# Patient Record
Sex: MALE | Race: WHITE | NOT HISPANIC OR LATINO | Employment: OTHER | URBAN - NONMETROPOLITAN AREA
[De-identification: names, ages, dates, MRNs, and addresses within clinical notes are randomized per-mention and may not be internally consistent; named-entity substitution may affect disease eponyms.]

---

## 2023-08-24 PROBLEM — R09.89 PULSE IRREGULARITY: Status: ACTIVE | Noted: 2023-08-24

## 2023-08-24 PROBLEM — I48.91 ATRIAL FIBRILLATION (MULTI): Status: ACTIVE | Noted: 2023-08-24

## 2023-08-24 PROBLEM — M79.89 SOFT TISSUE MASS: Status: ACTIVE | Noted: 2023-08-24

## 2023-08-24 RX ORDER — LISINOPRIL AND HYDROCHLOROTHIAZIDE 20; 25 MG/1; MG/1
1 TABLET ORAL DAILY
COMMUNITY

## 2023-08-24 RX ORDER — AMLODIPINE BESYLATE 10 MG/1
10 TABLET ORAL DAILY
COMMUNITY

## 2023-08-24 RX ORDER — FINASTERIDE 5 MG/1
5 TABLET, FILM COATED ORAL DAILY
COMMUNITY

## 2023-08-24 RX ORDER — DOXAZOSIN 8 MG/1
8 TABLET ORAL DAILY
COMMUNITY

## 2023-08-24 RX ORDER — ATORVASTATIN CALCIUM 40 MG/1
40 TABLET, FILM COATED ORAL DAILY
COMMUNITY

## 2023-08-24 RX ORDER — ALBUTEROL SULFATE 90 UG/1
AEROSOL, METERED RESPIRATORY (INHALATION) EVERY 4 HOURS PRN
COMMUNITY

## 2023-08-24 RX ORDER — METOPROLOL SUCCINATE 50 MG/1
50 TABLET, EXTENDED RELEASE ORAL DAILY
COMMUNITY

## 2023-10-03 ENCOUNTER — CLINICAL SUPPORT (OUTPATIENT)
Dept: CARDIAC REHAB | Facility: HOSPITAL | Age: 78
End: 2023-10-03
Payer: MEDICARE

## 2023-10-05 ENCOUNTER — APPOINTMENT (OUTPATIENT)
Dept: CARDIAC REHAB | Facility: HOSPITAL | Age: 78
End: 2023-10-05
Payer: MEDICARE

## 2023-10-10 ENCOUNTER — CLINICAL SUPPORT (OUTPATIENT)
Dept: CARDIAC REHAB | Facility: HOSPITAL | Age: 78
End: 2023-10-10
Payer: MEDICARE

## 2023-10-12 ENCOUNTER — APPOINTMENT (OUTPATIENT)
Dept: CARDIAC REHAB | Facility: HOSPITAL | Age: 78
End: 2023-10-12
Payer: MEDICARE

## 2023-10-17 ENCOUNTER — CLINICAL SUPPORT (OUTPATIENT)
Dept: CARDIAC REHAB | Facility: HOSPITAL | Age: 78
End: 2023-10-17
Payer: MEDICARE

## 2023-10-17 NOTE — PROGRESS NOTES
Pulmonary Rehabilitation Assessment    Name: Aquilino Koenig Jr.  Medical Record Number: [id number]  YOB: 1945    Today’s Date: 10/03/2023  Primary Care Physician: Lopez Pierce MD  Referring Physician: Rebeca    Program Location: [facility]  Primary Diagnosis: Idiopathic Pulmonary Fibrosis J84.112  Date of Diagnosis: 2023    Session #: 26    Oxygen Assessment    SP02 rest: 99%  SP02 exertion: 89%    Oxygen Use  Requires supplemental O2:    Liters at Rest: RA   Liters with Exertion: 0  Using O2 as prescribed: NA    Oxygen (Supplemental 02 use only)  Demonstrates appropriate knowledge of 02 use? NA  Demonstrates knowledge of 02 safety? NA  Home 02 system:  Portable 02:    Hypoxemia managed?:   Home Pulse Oximeter?: Yes      MMRC Survey score:  Intake: 3  Discharge:     Goal Status: In progress  Oxygen Goals:  Oxygen Interventions:      Psychosocial Assessment    Pt reported/currently experiencing: No  Currently seeing a mental health provider: No  Social Support: yes/wife son  PHQ-9 Survey Score:   Intake:   Discharge:     CAT Survey Score:  Intake:9  Discharge:    Learning assessment/barriers: None  Preferred learning method: Auditory     Educational Assessment:  Pulmonary Knowledge Test:   Intake: 13/15  Discharge: /15    Stages of Change:Maintenance    Goal Status: In progress  Psychosocial Goals: maintain  Psychosocial Interventions/Education: Education class    Nutrition Assessment:    Current Dietary Guidelines:  Barriers to dietary change:     Diet Habit Survey: drop down selection of Picture Your Plate, class  Plate: ed. class  Intake:   Discharge:       Weight Management    Height: 5'7  Weight: 192  BMI: 30.1    Goal Status:   Nutrition Goals: maintain  Nutrition Interventions/Education: Nutrition class with dietician    Exercise Assessment  Current Home Exercise: Yes   Mode: [facility]  Days/Week: 4  Min/Day: 35min    6-minute Walk Assessment:  Intake: 6-MWT distance  (meters):316.0m     FiO2:RA     SPO2:99% pre walk & 90% post walk  Discharge: 6-MWT distance (meters):      FiO2:     SPO2:    Exercise Prescription:    Based on: {6-min walk test/duke   Frequency:  3days/week   Mode: Treadmill,   Duration:  total aerobic minutes   Intensity: RPE 12       Target        Max METS 2.0       SpO2 Range 100to 89 %       O2 Flow Rate to L/min     Modality METS Load Duration   1 Pre-Exercise      2 Treadmill  2.1 1.5 12:00   3 Nustep 4000  2.9-3.0 6.0 23:00   4 Recumbent Cycle       5 Weights       6 Post-Exercise        Resistance Training: Yes   Home Exercise Prescription given: Yes     Goal Status: In progress  Exercise Goals: Increase strength, stamina & endurance  Exercise Interventions/Education: Education class about exercises with explanations & demonstrations    Other Core Components/Risk Factor Assessment:    Medication adherence:  Current Medications:    Current Outpatient Medications   Medication Sig Dispense Refill    albuterol 90 mcg/actuation inhaler Inhale every 4 hours if needed.      amLODIPine (Norvasc) 10 mg tablet Take 1 tablet (10 mg) by mouth once daily.      apixaban (Eliquis) 5 mg tablet 1 tablet (5 mg).      atorvastatin (Lipitor) 40 mg tablet Take 1 tablet (40 mg) by mouth once daily.      doxazosin (Cardura) 8 mg tablet Take 1 tablet (8 mg) by mouth once daily.      finasteride (Proscar) 5 mg tablet Take 1 tablet (5 mg) by mouth once daily.      lisinopriL-hydrochlorothiazide 20-25 mg tablet Take 1 tablet by mouth once daily.      metoprolol succinate XL (Toprol-XL) 50 mg 24 hr tablet Take 1 tablet (50 mg) by mouth once daily.       No current facility-administered medications for this visit.     Allergies:  No Known Allergies    Taking medications as prescribed: Yes    If no, why:    Uses pill box/organizer:    Carries medication list: Yes     Blood Pressure Management:  Hx of Hypertension: Yes   Resting BP:115/71   POST BP: 116/74    Goal Status: In  progress  Other Core Component Goals:   Other Core Component Interventions/Education:      # of hospital admissions due to lung problems: 0  # of ER visits due to lung problems: 0    Individual Patient Goals:  See above  General Comments:        Rehab Staff Signature: Bree Barnes, RRT

## 2023-10-18 NOTE — PROGRESS NOTES
Pulmonary Daily Rehabilitation Assessment    Name: Aquilino Koenig Jr.  Medical Record Number: [id number]  YOB: 1945    Today’s Date: 10/10/2023  Primary Care Physician: Lopez Pierce MD    Session #: 28    Oxygen Assessment    SP02 rest: 94%  SP02 exertion: 86%    Oxygen Use  Requires supplemental O2:    Liters at Rest: 0   Liters with Exertion: 0  Using O2 as prescribed:       Weight Management    Height: 5'7  Weight: 189LBS.  BMI: 30.1       Daily Activity Log   Modality METS Load Duration   1 Pre-Exercise      2 Treadmill  2.1   1.5 12:00   3 Nustep 4000  2.9-3.0 6.0 23:00   4 Recumbent Cycle       5 Weights       6 UBE W5 46-48RPM 15:00   7 Rower      8 Aero Bike      9 Post-Exercise            Other Core Components/Risk Factor Assessment:    Blood Pressure Management:  Hx of Hypertension: Yes   Resting BP: 138/93  POST BP: 140/82      General Comments:        Rehab Staff Signature: Bree Barnes, RRTESTEBAN ALATORRE

## 2023-10-18 NOTE — PROGRESS NOTES
Pulmonary Daily Rehabilitation Assessment    Name: Aquilino Koenig Jr.  Medical Record Number: [id number]  YOB: 1945    Today’s Date: 10/17/2023  Primary Care Physician: Lopez Pierce MD    Session #: 30    Oxygen Assessment    SP02 rest: 97%  SP02 exertion: 88 %    Oxygen Use  Requires supplemental O2:    Liters at Rest: 0   Liters with Exertion: 0  Using O2 as prescribed:       Weight Management    Height: 5'7  Weight: 188        Daily Activity Log   Modality METS Load Duration   1 Pre-Exercise      2 Treadmill  2.1 1.5 12:00   3 Nustep  2.7 6.0 23:00   4 Recumbent Cycle       5 Weights       6 UBE W5-10 48-53RPM 15:00   7 Rower      8 Aero Bike      9 Post-Exercise            Other Core Components/Risk Factor Assessment:    Blood Pressure Management:  Hx of Hypertension: Yes   Resting BP: 13/77  POST BP: 117/75      General Comments:Education: Medications  Pt. Will discontinue after today he has all his education classes and will continue to exercise at [facility]. 6MWT on 10/19/2023        Rehab Staff Signature: Bree Barnes RRTPulmonary Daily Rehabilitation Assessment      General Comments:        Rehab Staff Signature: Bree Barnes RRT  Cardiac Rehabilitation Initial Assessment (iITP)    Name: Aquilino Koenig Jr.  Medical Record Number: [id number]  YOB: 1945    Today’s Date: 10/18/2023  Primary Care Physician: Lopez Pierce MD  Referring Physician: Lopez Pierce    General  Idiopathetic pulmonary fibrosis        Medication adherence:   Current Medications:   Current Outpatient Medications   Medication Sig Dispense Refill    albuterol 90 mcg/actuation inhaler Inhale every 4 hours if needed.      amLODIPine (Norvasc) 10 mg tablet Take 1 tablet (10 mg) by mouth once daily.      apixaban (Eliquis) 5 mg tablet 1 tablet (5 mg).      atorvastatin (Lipitor) 40 mg tablet Take 1 tablet (40 mg) by mouth once daily.      doxazosin (Cardura) 8 mg tablet Take 1 tablet (8 mg) by  mouth once daily.      finasteride (Proscar) 5 mg tablet Take 1 tablet (5 mg) by mouth once daily.      lisinopriL-hydrochlorothiazide 20-25 mg tablet Take 1 tablet by mouth once daily.      metoprolol succinate XL (Toprol-XL) 50 mg 24 hr tablet Take 1 tablet (50 mg) by mouth once daily.       No current facility-administered medications for this visit.     Rehaab Staff Signature: Bree Barnes RRT  Electronic MD Review Signature:

## 2023-10-18 NOTE — PROGRESS NOTES
Pulmonary Daily Rehabilitation Assessment    Name: Aquilino Koenig Jr.  Medical Record Number: [id number]  YOB: 1945    Today’s Date: 10/17/2023  Primary Care Physician: Lopez Pierce MD    Session #: 30    Oxygen Assessment    SP02 rest: 97 %  SP02 exertion: 88 %    Oxygen Use  Requires supplemental O2:    Liters at Rest: 0   Liters with Exertion: 0  Using O2 as prescribed:       Weight Management    Height: 5'7  Weight: 188LBS.  BMI:30.1      Daily Activity Log   Modality METS Load Duration   1 Pre-Exercise      2 Treadmill  2.1 1.5 12:00   3 Nustep  2.7 6.0 23:00   4 Recumbent Cycle       5 Weights       6 UBE W5-10 48-53rpm 15:00   7 Rower      8 Aero Bike      9 Post-Exercise            Other Core Components/Risk Factor Assessment:    Blood Pressure Management:  Hx of Hypertension: Yes   Resting BP: 133/77  POST BP: 117/75      General Comments:  PATIENTS LAST DAY DUE TO COST - HE WILL CONTINUE TO EXERCISE AT [facility] - WILL COME IN 10/19/23 FOR END 6MWT      Rehab Staff Signature: Bree Barnes, RRTFOR MAME ALATORRE

## 2023-10-18 NOTE — SIGNIFICANT EVENT
Pt. Education: Airway clearance Instructed on acapella use/ cleaning. Discussed purse lip breathing and patients workouts outside for NE

## 2023-10-19 ENCOUNTER — CLINICAL SUPPORT (OUTPATIENT)
Dept: CARDIAC REHAB | Facility: HOSPITAL | Age: 78
End: 2023-10-19
Payer: MEDICARE

## 2023-10-19 NOTE — PROGRESS NOTES
Cardiac Rehabilitation Initial Assessment (iITP)    Name: Aquilino Koenig Jr.  Medical Record Number: [id number]  YOB: 1945    Today’s Date: 10/19/2023  Primary Care Physician: Lopez Pierce MD  Referring Physician: ***    General  No diagnosis found.    AACVPR Risk Stratification:{AACVPR Risk Stratification:79356}    Medical History  No past medical history on file.  No past surgical history on file.  Allergies: No Known Allergies    Psychosocial Assessment  Patient is returning for a follow up visit after reporting minimal to mild depressive symptoms.     Please document a new PHQ-9 score.    Pt reported/currently experiencing: {Yes/No:81390}  Currently seeing a mental health provider: {Yes/No:66403}  Social Support: {Yes/No:11069}    Learning assessment/barriers: {Assessment/barriers:78805}  Preferred learning method: {Preferred learning method:58081}   Quality of Life Survey:{Quality of Life Survey:61600}    Educational Assessment:  Cardiac Knowledge Test: ***/15    Stages of Change:{Stages of change:33369}    Psychosocial Goals: ***  Psychosocial Interventions/Education: ***        Nutrition Assessment:    Hyperlipidemia: {Yes/No:60938} ***    Lipids: ***  Lipid Draw Date: ***    Current Dietary Guidelines:{Dietary Guidelines:38653}  Barriers to dietary change: {Yes/No:39811} ***    Diet Habit Survey score: ***    Diabetes Assessment    Diabetes:{Yes or No:37976}   Medication: {Medication:41091}  Last Hemoglobin A1C: ***   Last Hemoglobin A1C date: ***  Pt monitors BS at home: {Yes/No:73697}  Frequency: ***  FBS range: ***  Hypoglycemic Episodes: {Yes/No:20218}    Weight Management:    There were no vitals filed for this visit.  There is no height or weight on file to calculate BMI.    Nutrition Goals: ***  Nutrition Interventions/Education: ***    Exercise Assessment:  Current Home Exercise: {Yes/No:03014}  Mode: ***  Days/Week: ***  Min/Day: ***    Exercise Prescription:    Based on:  {{Exercise Prescription:25983}   Frequency:  *** days/week   Mode: {Mode:99621}   Duration: *** total aerobic minutes   Intensity: RPE ***       Target HR ***       METS ***       SpO2 Range *** %       O2 Flow Rate *** L/min     Modality METS Load Duration   1 Pre-Exercise *** *** ***   2 Treadmill *** *** *** ***   3 Nustep 4000 *** *** *** ***   4 Recumbent Cycle *** *** *** ***   5 Weights *** *** *** ***   6 Post-Exercise *** *** ***     Exercise Goals: ***  Exercise Interventions/Education: ***    Other Core Components/Risk Factor Assessment:    Medication adherence:   Current Medications:   Current Outpatient Medications   Medication Sig Dispense Refill    albuterol 90 mcg/actuation inhaler Inhale every 4 hours if needed.      amLODIPine (Norvasc) 10 mg tablet Take 1 tablet (10 mg) by mouth once daily.      apixaban (Eliquis) 5 mg tablet 1 tablet (5 mg).      atorvastatin (Lipitor) 40 mg tablet Take 1 tablet (40 mg) by mouth once daily.      doxazosin (Cardura) 8 mg tablet Take 1 tablet (8 mg) by mouth once daily.      finasteride (Proscar) 5 mg tablet Take 1 tablet (5 mg) by mouth once daily.      lisinopriL-hydrochlorothiazide 20-25 mg tablet Take 1 tablet by mouth once daily.      metoprolol succinate XL (Toprol-XL) 50 mg 24 hr tablet Take 1 tablet (50 mg) by mouth once daily.       No current facility-administered medications for this visit.                 Taking medications as prescribed: {Yes/No:41517}   If no, why: ***   Uses pill box/organizer: {Yes/No:25493}   Carries medication list: {Yes/No:34750}    Blood Pressure Management:  Hx of Hypertension: {Yes/No:13197}  Resting BP: Growth %ile SmartLinks can only be used for patients less than 20 years old.     Heart Failure Management:  Hx of Heart Failure:{Yes/No:65234}  Type (selection): {Type (selection):22236}  Most recent EF: @LVEF%@  Date:***  Onset of heart failure diagnosis: ***  Last heart failure hospitalization: ***  Number of HF admissions  per year: ***  Symptoms:{Symptoms:76734}  Is there a family Hx of HF:{Yes/No:48977}  Does patient obtain daily weight {Yes/No:80865}    Smoking/Tobacco Assessment:    Tobacco Use: Not on file       Other Core Component Goals: ***  Other Core Component Interventions/Education: ***       Individual Patient Goals:  ***      Rehaab Staff Signature: Bree Barnes, RRT  Electronic MD Review Signature: ***

## 2023-10-19 NOTE — PROGRESS NOTES
Pulmonary Daily Rehabilitation Assessment    Name: Aquilino Koenig Jr.  Medical Record Number: [id number]  YOB: 1945    Today’s Date: 10/19/2023  Primary Care Physician: Lopez Pierce MD    Session #:     Oxygen Assessment    SP02 rest: 99RA %  SP02 exertion: 89 RA %    Oxygen Use  Requires supplemental O2:    Liters at Rest: 0   Liters with Exertion: 0  Using O2 as prescribed:       Weight Management    Height: 5'7  Weight: 190 lbs  BMI:       Daily Activity Log   Modality METS Load Duration   1 Pre-Exercise      2 Treadmill       3 Nustep 4000       4 Recumbent Cycle       5 Weights       6 UBE      7 Rower      8 Aero Bike      9 Post-Exercise            Other Core Components/Risk Factor Assessment:    Blood Pressure Management:  Hx of Hypertension: Yes   Resting BP: 123/82  POST BP:119/56      General Comments:  Pt in today for end paperwork and 6MWT  Pt. Walked 1,036ft/316.0M in 6min with 0 stops and 0 assists on room air  Pts lowest SpO2 was 89% with highest heartrate 117      Rehab Staff Signature: Bree Barnes, RRT

## 2023-10-19 NOTE — PROGRESS NOTES
Pulmonary Daily Rehabilitation Assessment    Name: Aquilino Koenig Jr.  Medical Record Number: [id number]  YOB: 1945    Today’s Date: 10/19/2023  Primary Care Physician: Lopez Pierce MD    Session #:     Oxygen Assessment    SP02 rest: 99RA %  SP02 exertion: 89 RA %    Oxygen Use  Requires supplemental O2:    Liters at Rest: 0   Liters with Exertion: 0  Using O2 as prescribed:       Weight Management    Height: 5'7  Weight: 190 lbs  BMI:       Daily Activity Log   Modality METS Load Duration   1 Pre-Exercise      2 Treadmill       3 Nustep 4000       4 Recumbent Cycle       5 Weights       6 UBE      7 Rower      8 Aero Bike      9 Post-Exercise            Other Core Components/Risk Factor Assessment:    Blood Pressure Management:  Hx of Hypertension: Yes   Resting BP: 123/82  POST BP:119/56      General Comments:  Pt in today for end paperwork and 6MWT  Pt. Walked 1,036ft/316.0M in 6min with 0 stops and 0 assists on room air  Pts lowest SpO2 was 89% with highest heartrate 117      Rehab Staff Signature: Bree Barnes, RRT

## 2023-10-19 NOTE — PROGRESS NOTES
Cardiac Rehabilitation Initial Assessment (iITP)    Name: Aquilino Koenig Jr.  Medical Record Number: [id number]  YOB: 1945    Today’s Date: 10/19/2023  Primary Care Physician: Lopez Pierce MD  Referring Physician: ***    General  No diagnosis found.    AACVPR Risk Stratification:{AACVPR Risk Stratification:99304}    Medical History  No past medical history on file.  No past surgical history on file.  Allergies: No Known Allergies    Psychosocial Assessment  Patient is returning for a follow up visit after reporting minimal to mild depressive symptoms.     Please document a new PHQ-9 score.    Pt reported/currently experiencing: {Yes/No:99319}  Currently seeing a mental health provider: {Yes/No:43243}  Social Support: {Yes/No:18939}    Learning assessment/barriers: {Assessment/barriers:33571}  Preferred learning method: {Preferred learning method:81967}   Quality of Life Survey:{Quality of Life Survey:12362}    Educational Assessment:  Cardiac Knowledge Test: ***/15    Stages of Change:{Stages of change:66449}    Psychosocial Goals: ***  Psychosocial Interventions/Education: ***        Nutrition Assessment:    Hyperlipidemia: {Yes/No:36767} ***    Lipids: ***  Lipid Draw Date: ***    Current Dietary Guidelines:{Dietary Guidelines:39135}  Barriers to dietary change: {Yes/No:24116} ***    Diet Habit Survey score: ***    Diabetes Assessment    Diabetes:{Yes or No:95238}   Medication: {Medication:71789}  Last Hemoglobin A1C: ***   Last Hemoglobin A1C date: ***  Pt monitors BS at home: {Yes/No:69530}  Frequency: ***  FBS range: ***  Hypoglycemic Episodes: {Yes/No:89333}    Weight Management:    There were no vitals filed for this visit.  There is no height or weight on file to calculate BMI.    Nutrition Goals: ***  Nutrition Interventions/Education: ***    Exercise Assessment:  Current Home Exercise: {Yes/No:11815}  Mode: ***  Days/Week: ***  Min/Day: ***    Exercise Prescription:    Based on:  {{Exercise Prescription:82269}   Frequency:  *** days/week   Mode: {Mode:10983}   Duration: *** total aerobic minutes   Intensity: RPE ***       Target HR ***       METS ***       SpO2 Range *** %       O2 Flow Rate *** L/min     Modality METS Load Duration   1 Pre-Exercise *** *** ***   2 Treadmill *** *** *** ***   3 Nustep 4000 *** *** *** ***   4 Recumbent Cycle *** *** *** ***   5 Weights *** *** *** ***   6 Post-Exercise *** *** ***     Exercise Goals: ***  Exercise Interventions/Education: ***    Other Core Components/Risk Factor Assessment:    Medication adherence:   Current Medications:   Current Outpatient Medications   Medication Sig Dispense Refill    albuterol 90 mcg/actuation inhaler Inhale every 4 hours if needed.      amLODIPine (Norvasc) 10 mg tablet Take 1 tablet (10 mg) by mouth once daily.      apixaban (Eliquis) 5 mg tablet 1 tablet (5 mg).      atorvastatin (Lipitor) 40 mg tablet Take 1 tablet (40 mg) by mouth once daily.      doxazosin (Cardura) 8 mg tablet Take 1 tablet (8 mg) by mouth once daily.      finasteride (Proscar) 5 mg tablet Take 1 tablet (5 mg) by mouth once daily.      lisinopriL-hydrochlorothiazide 20-25 mg tablet Take 1 tablet by mouth once daily.      metoprolol succinate XL (Toprol-XL) 50 mg 24 hr tablet Take 1 tablet (50 mg) by mouth once daily.       No current facility-administered medications for this visit.                 Taking medications as prescribed: {Yes/No:81076}   If no, why: ***   Uses pill box/organizer: {Yes/No:63037}   Carries medication list: {Yes/No:22694}    Blood Pressure Management:  Hx of Hypertension: {Yes/No:90862}  Resting BP: Growth %ile SmartLinks can only be used for patients less than 20 years old.     Heart Failure Management:  Hx of Heart Failure:{Yes/No:14720}  Type (selection): {Type (selection):80398}  Most recent EF: @LVEF%@  Date:***  Onset of heart failure diagnosis: ***  Last heart failure hospitalization: ***  Number of HF admissions  per year: ***  Symptoms:{Symptoms:74995}  Is there a family Hx of HF:{Yes/No:70231}  Does patient obtain daily weight {Yes/No:25247}    Smoking/Tobacco Assessment:    Tobacco Use: Not on file       Other Core Component Goals: ***  Other Core Component Interventions/Education: ***       Individual Patient Goals:  ***      Rehaab Staff Signature: Bree Barnes, RRT  Electronic MD Review Signature: ***

## 2023-10-24 ENCOUNTER — APPOINTMENT (OUTPATIENT)
Dept: CARDIAC REHAB | Facility: HOSPITAL | Age: 78
End: 2023-10-24
Payer: MEDICARE

## 2023-10-26 ENCOUNTER — APPOINTMENT (OUTPATIENT)
Dept: CARDIAC REHAB | Facility: HOSPITAL | Age: 78
End: 2023-10-26
Payer: MEDICARE

## 2023-10-31 ENCOUNTER — APPOINTMENT (OUTPATIENT)
Dept: CARDIAC REHAB | Facility: HOSPITAL | Age: 78
End: 2023-10-31
Payer: MEDICARE

## 2023-11-02 ENCOUNTER — APPOINTMENT (OUTPATIENT)
Dept: CARDIAC REHAB | Facility: HOSPITAL | Age: 78
End: 2023-11-02
Payer: MEDICARE

## 2023-11-09 ENCOUNTER — APPOINTMENT (OUTPATIENT)
Dept: CARDIAC REHAB | Facility: HOSPITAL | Age: 78
End: 2023-11-09

## 2024-10-18 ENCOUNTER — HOSPITAL ENCOUNTER (OUTPATIENT)
Dept: RADIOLOGY | Facility: HOSPITAL | Age: 79
Discharge: HOME | End: 2024-10-18
Payer: MEDICARE

## 2024-10-18 DIAGNOSIS — R06.00 DYSPNEA, UNSPECIFIED: ICD-10-CM

## 2024-10-18 DIAGNOSIS — R06.00 DYSPNEA, UNSPECIFIED: Primary | ICD-10-CM

## 2024-10-18 PROCEDURE — 71275 CT ANGIOGRAPHY CHEST: CPT

## 2024-10-18 PROCEDURE — 2550000001 HC RX 255 CONTRASTS

## 2024-11-04 ENCOUNTER — APPOINTMENT (OUTPATIENT)
Dept: CARDIOLOGY | Facility: HOSPITAL | Age: 79
DRG: 189 | End: 2024-11-04
Payer: MEDICARE

## 2024-11-04 ENCOUNTER — APPOINTMENT (OUTPATIENT)
Dept: RADIOLOGY | Facility: HOSPITAL | Age: 79
DRG: 189 | End: 2024-11-04
Payer: MEDICARE

## 2024-11-04 ENCOUNTER — HOSPITAL ENCOUNTER (INPATIENT)
Facility: HOSPITAL | Age: 79
LOS: 1 days | Discharge: HOME | DRG: 189 | End: 2024-11-06
Attending: INTERNAL MEDICINE | Admitting: INTERNAL MEDICINE
Payer: MEDICARE

## 2024-11-04 DIAGNOSIS — J84.112 IPF (IDIOPATHIC PULMONARY FIBROSIS) (MULTI): ICD-10-CM

## 2024-11-04 DIAGNOSIS — R06.09 DYSPNEA ON EXERTION: ICD-10-CM

## 2024-11-04 DIAGNOSIS — J96.21 ACUTE ON CHRONIC RESPIRATORY FAILURE WITH HYPOXIA (MULTI): Primary | ICD-10-CM

## 2024-11-04 DIAGNOSIS — J44.9 CHRONIC OBSTRUCTIVE PULMONARY DISEASE, UNSPECIFIED COPD TYPE (MULTI): ICD-10-CM

## 2024-11-04 DIAGNOSIS — I48.0 PAROXYSMAL ATRIAL FIBRILLATION (MULTI): ICD-10-CM

## 2024-11-04 PROBLEM — R06.00 DYSPNEA: Status: ACTIVE | Noted: 2024-11-04

## 2024-11-04 LAB
ALBUMIN SERPL BCP-MCNC: 3.7 G/DL (ref 3.4–5)
ALP SERPL-CCNC: 74 U/L (ref 33–136)
ALT SERPL W P-5'-P-CCNC: 24 U/L (ref 10–52)
ANION GAP BLDV CALCULATED.4IONS-SCNC: 7 MMOL/L (ref 10–25)
ANION GAP SERPL CALC-SCNC: 15 MMOL/L (ref 10–20)
APPEARANCE UR: CLEAR
AST SERPL W P-5'-P-CCNC: 26 U/L (ref 9–39)
BASE EXCESS BLDV CALC-SCNC: 7.7 MMOL/L (ref -2–3)
BASOPHILS # BLD AUTO: 0.01 X10*3/UL (ref 0–0.1)
BASOPHILS NFR BLD AUTO: 0.1 %
BILIRUB SERPL-MCNC: 0.7 MG/DL (ref 0–1.2)
BILIRUB UR STRIP.AUTO-MCNC: NEGATIVE MG/DL
BNP SERPL-MCNC: 218 PG/ML (ref 0–99)
BODY TEMPERATURE: ABNORMAL
BUN SERPL-MCNC: 17 MG/DL (ref 6–23)
CA-I BLDV-SCNC: 1.21 MMOL/L (ref 1.1–1.33)
CALCIUM SERPL-MCNC: 8.8 MG/DL (ref 8.6–10.3)
CARDIAC TROPONIN I PNL SERPL HS: 11 NG/L (ref 0–20)
CARDIAC TROPONIN I PNL SERPL HS: 11 NG/L (ref 0–20)
CHLORIDE BLDV-SCNC: 105 MMOL/L (ref 98–107)
CHLORIDE SERPL-SCNC: 102 MMOL/L (ref 98–107)
CO2 SERPL-SCNC: 27 MMOL/L (ref 21–32)
COLOR UR: YELLOW
CREAT SERPL-MCNC: 0.63 MG/DL (ref 0.5–1.3)
EGFRCR SERPLBLD CKD-EPI 2021: >90 ML/MIN/1.73M*2
EOSINOPHIL # BLD AUTO: 0.04 X10*3/UL (ref 0–0.4)
EOSINOPHIL NFR BLD AUTO: 0.3 %
ERYTHROCYTE [DISTWIDTH] IN BLOOD BY AUTOMATED COUNT: 13.2 % (ref 11.5–14.5)
FLUAV RNA RESP QL NAA+PROBE: NOT DETECTED
FLUBV RNA RESP QL NAA+PROBE: NOT DETECTED
GLUCOSE BLDV-MCNC: 153 MG/DL (ref 74–99)
GLUCOSE SERPL-MCNC: 161 MG/DL (ref 74–99)
GLUCOSE UR STRIP.AUTO-MCNC: NORMAL MG/DL
HCO3 BLDV-SCNC: 30.9 MMOL/L (ref 22–26)
HCT VFR BLD AUTO: 39.8 % (ref 41–52)
HCT VFR BLD EST: 28 % (ref 41–52)
HGB BLD-MCNC: 13.1 G/DL (ref 13.5–17.5)
HGB BLDV-MCNC: 9.3 G/DL (ref 13.5–17.5)
IMM GRANULOCYTES # BLD AUTO: 0.07 X10*3/UL (ref 0–0.5)
IMM GRANULOCYTES NFR BLD AUTO: 0.6 % (ref 0–0.9)
INHALED O2 CONCENTRATION: 28 %
INR PPP: 1.7 (ref 0.9–1.1)
KETONES UR STRIP.AUTO-MCNC: NEGATIVE MG/DL
LACTATE BLDV-SCNC: 2.3 MMOL/L (ref 0.4–2)
LACTATE SERPL-SCNC: 2 MMOL/L (ref 0.4–2)
LEUKOCYTE ESTERASE UR QL STRIP.AUTO: NEGATIVE
LYMPHOCYTES # BLD AUTO: 0.38 X10*3/UL (ref 0.8–3)
LYMPHOCYTES NFR BLD AUTO: 3.2 %
MAGNESIUM SERPL-MCNC: 1.74 MG/DL (ref 1.6–2.4)
MCH RBC QN AUTO: 35 PG (ref 26–34)
MCHC RBC AUTO-ENTMCNC: 32.9 G/DL (ref 32–36)
MCV RBC AUTO: 106 FL (ref 80–100)
MONOCYTES # BLD AUTO: 0.45 X10*3/UL (ref 0.05–0.8)
MONOCYTES NFR BLD AUTO: 3.8 %
MUCOUS THREADS #/AREA URNS AUTO: ABNORMAL /LPF
NEUTROPHILS # BLD AUTO: 10.82 X10*3/UL (ref 1.6–5.5)
NEUTROPHILS NFR BLD AUTO: 92 %
NITRITE UR QL STRIP.AUTO: NEGATIVE
NRBC BLD-RTO: 0 /100 WBCS (ref 0–0)
OXYHGB MFR BLDV: 93.8 % (ref 45–75)
PCO2 BLDV: 37 MM HG (ref 41–51)
PH BLDV: 7.53 PH (ref 7.33–7.43)
PH UR STRIP.AUTO: 6 [PH]
PLATELET # BLD AUTO: 263 X10*3/UL (ref 150–450)
PO2 BLDV: 70 MM HG (ref 35–45)
POTASSIUM BLDV-SCNC: 4.3 MMOL/L (ref 3.5–5.3)
POTASSIUM SERPL-SCNC: 4 MMOL/L (ref 3.5–5.3)
PROT SERPL-MCNC: 6.8 G/DL (ref 6.4–8.2)
PROT UR STRIP.AUTO-MCNC: ABNORMAL MG/DL
PROTHROMBIN TIME: 19.8 SECONDS (ref 9.8–12.8)
RBC # BLD AUTO: 3.74 X10*6/UL (ref 4.5–5.9)
RBC # UR STRIP.AUTO: ABNORMAL /UL
RBC #/AREA URNS AUTO: ABNORMAL /HPF
SAO2 % BLDV: 96 % (ref 45–75)
SARS-COV-2 RNA RESP QL NAA+PROBE: NOT DETECTED
SODIUM BLDV-SCNC: 139 MMOL/L (ref 136–145)
SODIUM SERPL-SCNC: 140 MMOL/L (ref 136–145)
SP GR UR STRIP.AUTO: 1.02
UROBILINOGEN UR STRIP.AUTO-MCNC: ABNORMAL MG/DL
WBC # BLD AUTO: 11.8 X10*3/UL (ref 4.4–11.3)
WBC #/AREA URNS AUTO: ABNORMAL /HPF

## 2024-11-04 PROCEDURE — 84132 ASSAY OF SERUM POTASSIUM: CPT | Performed by: HEALTH CARE PROVIDER

## 2024-11-04 PROCEDURE — 96374 THER/PROPH/DIAG INJ IV PUSH: CPT

## 2024-11-04 PROCEDURE — G0378 HOSPITAL OBSERVATION PER HR: HCPCS

## 2024-11-04 PROCEDURE — 93005 ELECTROCARDIOGRAM TRACING: CPT

## 2024-11-04 PROCEDURE — 94760 N-INVAS EAR/PLS OXIMETRY 1: CPT | Mod: CCI,MUE

## 2024-11-04 PROCEDURE — 83605 ASSAY OF LACTIC ACID: CPT | Performed by: HEALTH CARE PROVIDER

## 2024-11-04 PROCEDURE — 85610 PROTHROMBIN TIME: CPT | Performed by: HEALTH CARE PROVIDER

## 2024-11-04 PROCEDURE — 36415 COLL VENOUS BLD VENIPUNCTURE: CPT | Performed by: HEALTH CARE PROVIDER

## 2024-11-04 PROCEDURE — 85025 COMPLETE CBC W/AUTO DIFF WBC: CPT | Performed by: HEALTH CARE PROVIDER

## 2024-11-04 PROCEDURE — 81001 URINALYSIS AUTO W/SCOPE: CPT | Performed by: HEALTH CARE PROVIDER

## 2024-11-04 PROCEDURE — 2500000001 HC RX 250 WO HCPCS SELF ADMINISTERED DRUGS (ALT 637 FOR MEDICARE OP)

## 2024-11-04 PROCEDURE — 99285 EMERGENCY DEPT VISIT HI MDM: CPT | Mod: 25

## 2024-11-04 PROCEDURE — 94664 DEMO&/EVAL PT USE INHALER: CPT

## 2024-11-04 PROCEDURE — 83735 ASSAY OF MAGNESIUM: CPT | Performed by: HEALTH CARE PROVIDER

## 2024-11-04 PROCEDURE — 83880 ASSAY OF NATRIURETIC PEPTIDE: CPT | Performed by: HEALTH CARE PROVIDER

## 2024-11-04 PROCEDURE — 2500000005 HC RX 250 GENERAL PHARMACY W/O HCPCS: Performed by: EMERGENCY MEDICINE

## 2024-11-04 PROCEDURE — 2500000004 HC RX 250 GENERAL PHARMACY W/ HCPCS (ALT 636 FOR OP/ED): Performed by: HEALTH CARE PROVIDER

## 2024-11-04 PROCEDURE — 71045 X-RAY EXAM CHEST 1 VIEW: CPT | Mod: FOREIGN READ | Performed by: RADIOLOGY

## 2024-11-04 PROCEDURE — 9420000001 HC RT PATIENT EDUCATION 5 MIN

## 2024-11-04 PROCEDURE — 94760 N-INVAS EAR/PLS OXIMETRY 1: CPT

## 2024-11-04 PROCEDURE — 71045 X-RAY EXAM CHEST 1 VIEW: CPT

## 2024-11-04 PROCEDURE — 94640 AIRWAY INHALATION TREATMENT: CPT | Mod: MUE

## 2024-11-04 PROCEDURE — 84484 ASSAY OF TROPONIN QUANT: CPT | Performed by: HEALTH CARE PROVIDER

## 2024-11-04 PROCEDURE — 94640 AIRWAY INHALATION TREATMENT: CPT

## 2024-11-04 PROCEDURE — 87636 SARSCOV2 & INF A&B AMP PRB: CPT | Performed by: HEALTH CARE PROVIDER

## 2024-11-04 PROCEDURE — 2500000002 HC RX 250 W HCPCS SELF ADMINISTERED DRUGS (ALT 637 FOR MEDICARE OP, ALT 636 FOR OP/ED): Performed by: HEALTH CARE PROVIDER

## 2024-11-04 RX ORDER — DOXAZOSIN 2 MG/1
8 TABLET ORAL DAILY
Status: DISCONTINUED | OUTPATIENT
Start: 2024-11-04 | End: 2024-11-06 | Stop reason: HOSPADM

## 2024-11-04 RX ORDER — ACETAMINOPHEN 325 MG/1
650 TABLET ORAL EVERY 4 HOURS PRN
Status: DISCONTINUED | OUTPATIENT
Start: 2024-11-04 | End: 2024-11-05

## 2024-11-04 RX ORDER — POLYETHYLENE GLYCOL 3350 17 G/17G
17 POWDER, FOR SOLUTION ORAL DAILY PRN
Status: DISCONTINUED | OUTPATIENT
Start: 2024-11-04 | End: 2024-11-06 | Stop reason: HOSPADM

## 2024-11-04 RX ORDER — FUROSEMIDE 10 MG/ML
20 INJECTION INTRAMUSCULAR; INTRAVENOUS ONCE
Status: COMPLETED | OUTPATIENT
Start: 2024-11-04 | End: 2024-11-04

## 2024-11-04 RX ORDER — TORSEMIDE 20 MG/1
20 TABLET ORAL DAILY
Status: DISCONTINUED | OUTPATIENT
Start: 2024-11-04 | End: 2024-11-06 | Stop reason: HOSPADM

## 2024-11-04 RX ORDER — ATORVASTATIN CALCIUM 40 MG/1
40 TABLET, FILM COATED ORAL DAILY
Status: DISCONTINUED | OUTPATIENT
Start: 2024-11-04 | End: 2024-11-06 | Stop reason: HOSPADM

## 2024-11-04 RX ORDER — IPRATROPIUM BROMIDE AND ALBUTEROL SULFATE 2.5; .5 MG/3ML; MG/3ML
3 SOLUTION RESPIRATORY (INHALATION) ONCE
Status: COMPLETED | OUTPATIENT
Start: 2024-11-04 | End: 2024-11-04

## 2024-11-04 RX ORDER — METOPROLOL SUCCINATE 25 MG/1
50 TABLET, EXTENDED RELEASE ORAL DAILY
Status: DISCONTINUED | OUTPATIENT
Start: 2024-11-04 | End: 2024-11-06 | Stop reason: HOSPADM

## 2024-11-04 RX ORDER — ONDANSETRON HYDROCHLORIDE 2 MG/ML
4 INJECTION, SOLUTION INTRAVENOUS EVERY 8 HOURS PRN
Status: DISCONTINUED | OUTPATIENT
Start: 2024-11-04 | End: 2024-11-06 | Stop reason: HOSPADM

## 2024-11-04 RX ORDER — ALBUTEROL SULFATE 0.83 MG/ML
2.5 SOLUTION RESPIRATORY (INHALATION) 2 TIMES DAILY PRN
Status: DISCONTINUED | OUTPATIENT
Start: 2024-11-04 | End: 2024-11-05

## 2024-11-04 RX ORDER — ACETAMINOPHEN 160 MG/5ML
650 SOLUTION ORAL EVERY 4 HOURS PRN
Status: DISCONTINUED | OUTPATIENT
Start: 2024-11-04 | End: 2024-11-05

## 2024-11-04 RX ORDER — TALC
9 POWDER (GRAM) TOPICAL NIGHTLY PRN
Status: DISCONTINUED | OUTPATIENT
Start: 2024-11-04 | End: 2024-11-06 | Stop reason: HOSPADM

## 2024-11-04 RX ORDER — ACETAMINOPHEN 650 MG/1
650 SUPPOSITORY RECTAL EVERY 4 HOURS PRN
Status: DISCONTINUED | OUTPATIENT
Start: 2024-11-04 | End: 2024-11-05

## 2024-11-04 RX ORDER — NINTEDANIB 150 MG/1
1 CAPSULE ORAL EVERY 12 HOURS
COMMUNITY

## 2024-11-04 RX ORDER — PANTOPRAZOLE SODIUM 40 MG/1
40 TABLET, DELAYED RELEASE ORAL
Status: DISCONTINUED | OUTPATIENT
Start: 2024-11-05 | End: 2024-11-06 | Stop reason: HOSPADM

## 2024-11-04 RX ORDER — IPRATROPIUM BROMIDE AND ALBUTEROL SULFATE 2.5; .5 MG/3ML; MG/3ML
3 SOLUTION RESPIRATORY (INHALATION) 3 TIMES DAILY
Status: DISCONTINUED | OUTPATIENT
Start: 2024-11-05 | End: 2024-11-06 | Stop reason: HOSPADM

## 2024-11-04 RX ORDER — FINASTERIDE 5 MG/1
5 TABLET, FILM COATED ORAL DAILY
Status: DISCONTINUED | OUTPATIENT
Start: 2024-11-04 | End: 2024-11-06 | Stop reason: HOSPADM

## 2024-11-04 RX ORDER — PANTOPRAZOLE SODIUM 40 MG/10ML
40 INJECTION, POWDER, LYOPHILIZED, FOR SOLUTION INTRAVENOUS
Status: DISCONTINUED | OUTPATIENT
Start: 2024-11-05 | End: 2024-11-05

## 2024-11-04 RX ORDER — ALBUTEROL SULFATE 0.83 MG/ML
2.5 SOLUTION RESPIRATORY (INHALATION) ONCE
Status: COMPLETED | OUTPATIENT
Start: 2024-11-04 | End: 2024-11-04

## 2024-11-04 RX ORDER — ONDANSETRON 4 MG/1
4 TABLET, FILM COATED ORAL EVERY 8 HOURS PRN
Status: DISCONTINUED | OUTPATIENT
Start: 2024-11-04 | End: 2024-11-06 | Stop reason: HOSPADM

## 2024-11-04 RX ORDER — TORSEMIDE 20 MG/1
20 TABLET ORAL DAILY
COMMUNITY
End: 2024-11-06 | Stop reason: HOSPADM

## 2024-11-04 SDOH — SOCIAL STABILITY: SOCIAL INSECURITY: ARE THERE ANY APPARENT SIGNS OF INJURIES/BEHAVIORS THAT COULD BE RELATED TO ABUSE/NEGLECT?: NO

## 2024-11-04 SDOH — ECONOMIC STABILITY: HOUSING INSECURITY: AT ANY TIME IN THE PAST 12 MONTHS, WERE YOU HOMELESS OR LIVING IN A SHELTER (INCLUDING NOW)?: NO

## 2024-11-04 SDOH — SOCIAL STABILITY: SOCIAL INSECURITY
WITHIN THE LAST YEAR, HAVE YOU BEEN RAPED OR FORCED TO HAVE ANY KIND OF SEXUAL ACTIVITY BY YOUR PARTNER OR EX-PARTNER?: NO

## 2024-11-04 SDOH — ECONOMIC STABILITY: FOOD INSECURITY: WITHIN THE PAST 12 MONTHS, THE FOOD YOU BOUGHT JUST DIDN'T LAST AND YOU DIDN'T HAVE MONEY TO GET MORE.: NEVER TRUE

## 2024-11-04 SDOH — SOCIAL STABILITY: SOCIAL INSECURITY: WITHIN THE LAST YEAR, HAVE YOU BEEN AFRAID OF YOUR PARTNER OR EX-PARTNER?: NO

## 2024-11-04 SDOH — HEALTH STABILITY: PHYSICAL HEALTH
HOW OFTEN DO YOU NEED TO HAVE SOMEONE HELP YOU WHEN YOU READ INSTRUCTIONS, PAMPHLETS, OR OTHER WRITTEN MATERIAL FROM YOUR DOCTOR OR PHARMACY?: SOMETIMES

## 2024-11-04 SDOH — SOCIAL STABILITY: SOCIAL INSECURITY: WERE YOU ABLE TO COMPLETE ALL THE BEHAVIORAL HEALTH SCREENINGS?: YES

## 2024-11-04 SDOH — SOCIAL STABILITY: SOCIAL INSECURITY: DOES ANYONE TRY TO KEEP YOU FROM HAVING/CONTACTING OTHER FRIENDS OR DOING THINGS OUTSIDE YOUR HOME?: NO

## 2024-11-04 SDOH — ECONOMIC STABILITY: FOOD INSECURITY: WITHIN THE PAST 12 MONTHS, YOU WORRIED THAT YOUR FOOD WOULD RUN OUT BEFORE YOU GOT THE MONEY TO BUY MORE.: NEVER TRUE

## 2024-11-04 SDOH — SOCIAL STABILITY: SOCIAL INSECURITY
WITHIN THE LAST YEAR, HAVE YOU BEEN KICKED, HIT, SLAPPED, OR OTHERWISE PHYSICALLY HURT BY YOUR PARTNER OR EX-PARTNER?: NO

## 2024-11-04 SDOH — ECONOMIC STABILITY: HOUSING INSECURITY: IN THE LAST 12 MONTHS, WAS THERE A TIME WHEN YOU WERE NOT ABLE TO PAY THE MORTGAGE OR RENT ON TIME?: NO

## 2024-11-04 SDOH — SOCIAL STABILITY: SOCIAL INSECURITY: DO YOU FEEL ANYONE HAS EXPLOITED OR TAKEN ADVANTAGE OF YOU FINANCIALLY OR OF YOUR PERSONAL PROPERTY?: NO

## 2024-11-04 SDOH — SOCIAL STABILITY: SOCIAL INSECURITY: HAS ANYONE EVER THREATENED TO HURT YOUR FAMILY OR YOUR PETS?: NO

## 2024-11-04 SDOH — ECONOMIC STABILITY: HOUSING INSECURITY: IN THE PAST 12 MONTHS, HOW MANY TIMES HAVE YOU MOVED WHERE YOU WERE LIVING?: 0

## 2024-11-04 SDOH — SOCIAL STABILITY: SOCIAL INSECURITY: WITHIN THE LAST YEAR, HAVE YOU BEEN HUMILIATED OR EMOTIONALLY ABUSED IN OTHER WAYS BY YOUR PARTNER OR EX-PARTNER?: NO

## 2024-11-04 SDOH — SOCIAL STABILITY: SOCIAL INSECURITY: ABUSE: ADULT

## 2024-11-04 SDOH — ECONOMIC STABILITY: TRANSPORTATION INSECURITY: IN THE PAST 12 MONTHS, HAS LACK OF TRANSPORTATION KEPT YOU FROM MEDICAL APPOINTMENTS OR FROM GETTING MEDICATIONS?: NO

## 2024-11-04 SDOH — ECONOMIC STABILITY: FOOD INSECURITY: HOW HARD IS IT FOR YOU TO PAY FOR THE VERY BASICS LIKE FOOD, HOUSING, MEDICAL CARE, AND HEATING?: NOT HARD AT ALL

## 2024-11-04 SDOH — SOCIAL STABILITY: SOCIAL INSECURITY: DO YOU FEEL UNSAFE GOING BACK TO THE PLACE WHERE YOU ARE LIVING?: NO

## 2024-11-04 SDOH — ECONOMIC STABILITY: INCOME INSECURITY: IN THE PAST 12 MONTHS HAS THE ELECTRIC, GAS, OIL, OR WATER COMPANY THREATENED TO SHUT OFF SERVICES IN YOUR HOME?: NO

## 2024-11-04 SDOH — SOCIAL STABILITY: SOCIAL INSECURITY: HAVE YOU HAD ANY THOUGHTS OF HARMING ANYONE ELSE?: NO

## 2024-11-04 SDOH — SOCIAL STABILITY: SOCIAL INSECURITY: ARE YOU OR HAVE YOU BEEN THREATENED OR ABUSED PHYSICALLY, EMOTIONALLY, OR SEXUALLY BY ANYONE?: NO

## 2024-11-04 SDOH — SOCIAL STABILITY: SOCIAL INSECURITY: HAVE YOU HAD THOUGHTS OF HARMING ANYONE ELSE?: NO

## 2024-11-04 ASSESSMENT — ACTIVITIES OF DAILY LIVING (ADL)
PATIENT'S MEMORY ADEQUATE TO SAFELY COMPLETE DAILY ACTIVITIES?: YES
LACK_OF_TRANSPORTATION: NO
WALKS IN HOME: INDEPENDENT
FEEDING YOURSELF: INDEPENDENT
HEARING - LEFT EAR: FUNCTIONAL
LACK_OF_TRANSPORTATION: NO
ADEQUATE_TO_COMPLETE_ADL: YES
JUDGMENT_ADEQUATE_SAFELY_COMPLETE_DAILY_ACTIVITIES: YES
HEARING - RIGHT EAR: FUNCTIONAL
LACK_OF_TRANSPORTATION: NO
LACK_OF_TRANSPORTATION: NO
TOILETING: INDEPENDENT
GROOMING: INDEPENDENT
BATHING: INDEPENDENT
DRESSING YOURSELF: INDEPENDENT
ASSISTIVE_DEVICE: EYEGLASSES;DENTURES PARTIAL

## 2024-11-04 ASSESSMENT — COGNITIVE AND FUNCTIONAL STATUS - GENERAL
MOBILITY SCORE: 24
MOBILITY SCORE: 24
PATIENT BASELINE BEDBOUND: NO
DAILY ACTIVITIY SCORE: 24
DAILY ACTIVITIY SCORE: 24

## 2024-11-04 ASSESSMENT — PATIENT HEALTH QUESTIONNAIRE - PHQ9
2. FEELING DOWN, DEPRESSED OR HOPELESS: NOT AT ALL
1. LITTLE INTEREST OR PLEASURE IN DOING THINGS: NOT AT ALL
2. FEELING DOWN, DEPRESSED OR HOPELESS: NOT AT ALL
SUM OF ALL RESPONSES TO PHQ9 QUESTIONS 1 & 2: 0
SUM OF ALL RESPONSES TO PHQ9 QUESTIONS 1 & 2: 0
1. LITTLE INTEREST OR PLEASURE IN DOING THINGS: NOT AT ALL

## 2024-11-04 ASSESSMENT — LIFESTYLE VARIABLES
HOW OFTEN DO YOU HAVE A DRINK CONTAINING ALCOHOL: MONTHLY OR LESS
SUBSTANCE_ABUSE_PAST_12_MONTHS: NO
HOW MANY STANDARD DRINKS CONTAINING ALCOHOL DO YOU HAVE ON A TYPICAL DAY: 1 OR 2
HOW MANY STANDARD DRINKS CONTAINING ALCOHOL DO YOU HAVE ON A TYPICAL DAY: 1 OR 2
AUDIT-C TOTAL SCORE: 2
SKIP TO QUESTIONS 9-10: 0
HOW OFTEN DO YOU HAVE 6 OR MORE DRINKS ON ONE OCCASION: LESS THAN MONTHLY
AUDIT-C TOTAL SCORE: 2
HOW OFTEN DO YOU HAVE A DRINK CONTAINING ALCOHOL: MONTHLY OR LESS
SKIP TO QUESTIONS 9-10: 0
PRESCIPTION_ABUSE_PAST_12_MONTHS: NO
HOW OFTEN DO YOU HAVE 6 OR MORE DRINKS ON ONE OCCASION: LESS THAN MONTHLY
AUDIT-C TOTAL SCORE: 2
AUDIT-C TOTAL SCORE: 2

## 2024-11-04 ASSESSMENT — COLUMBIA-SUICIDE SEVERITY RATING SCALE - C-SSRS
1. IN THE PAST MONTH, HAVE YOU WISHED YOU WERE DEAD OR WISHED YOU COULD GO TO SLEEP AND NOT WAKE UP?: NO
6. HAVE YOU EVER DONE ANYTHING, STARTED TO DO ANYTHING, OR PREPARED TO DO ANYTHING TO END YOUR LIFE?: NO
2. HAVE YOU ACTUALLY HAD ANY THOUGHTS OF KILLING YOURSELF?: NO

## 2024-11-04 ASSESSMENT — PAIN SCALES - GENERAL
PAINLEVEL_OUTOF10: 0 - NO PAIN

## 2024-11-04 ASSESSMENT — PAIN - FUNCTIONAL ASSESSMENT
PAIN_FUNCTIONAL_ASSESSMENT: 0-10
PAIN_FUNCTIONAL_ASSESSMENT: 0-10

## 2024-11-04 NOTE — ED PROVIDER NOTES
HPI   Chief Complaint   Patient presents with    Cough    Shortness of Breath       CC: Shortness of breath on exertion, cough  HPI:   79-year-old male with history of chronic A-fib, pulmonary fibrosis, He reports having congestion, runny nose, sinus headache, and cough. He reports having increasing SOB. Significant medical history of a-fib, HTN, ILD, chronic hypoxic respiratory failure, pulmonary hypertension. Denies any known exposure to sick contacts. Patient denies leg edema, calf pain, recent travel, immobilization, recent surgery or trauma, hemoptysis, hormonal therapy, personal history of DVT or PE. He denies fever/chills/sweats, fatigue, body aches, ear pain, sore throat, mouth sores, sinus pain or pressure, change in voice, neck stiffness/pain, difficulty breathing or swallowing, hemoptysis, wheezing, CP, palpitations, abd pain, n/v/d, rashes, HA, dizziness/lightheadedness. He denies any other symptoms or complaints.    Additional Limitations to History:   External Records Reviewed: I reviewed recent and relevant outside records including   History Obtained From:     Past Medical History: Per HPI  Medications: Reviewed in EMR and with patient  Allergies:  Reviewed in EMR  Past Surgical History:   Social History:     ------------------------------------------------------------------------------------------------------  Physical Exam:  --Vital signs reviewed in nursing triage note, EMR flow sheets, and at patient's bedside  GEN:  A&Ox3, no acute distress, appears comfortable.  Conversational and appropriate.  No confusion or gross mental status changes.  EYES: EOMI, non-injected sclera.  ENT: Moist mucous membranes, no apparent injuries or lesions.   CARDIO: Normal rate and regular rhythm. No murmurs, rubs, or gallops.  2+ equal pulses of the distal extremities.   PULM: Mildly decreased in the lower lobes bilaterally no rales, rhonchi, or wheezes.   GI: Soft, non-tender, non-distended. No rebound tenderness  or guarding.  SKIN: Warm and dry, no rashes or lesions.  MSK: ROM intact the extremities without contractures.   EXT: No peripheral edema, contusions, or wounds.   NEURO: Cranial nerves II-XII grossly intact. Sensation to light touch intact and equal bilaterally in upper and lower extremities.  Symmetric 5/5 strength in upper and lower extremities.  PSYCH: Appropriate mood and behavior, converses and responds appropriately during exam.  -------------------------------------------------------------------------------------------------------        Differential Diagnoses Considered:   Chronic Medical Conditions Significantly Affecting Care:   Diagnostic testing considered: [PERC, D-Dimer, PECARN, etc.]    - EKG interpreted by myself atrial fibrillation with rapid ventricular response ventricular at 109 normal QRS duration of prolonged QT/QTc no obvious ST elevation, depression or acute ischemic findings.  - I independently interpreted: [CXR, CT, POCUS, etc. including your interpretation]  - Labs notable for     Escalation of Care: Appropriate for   Social Determinants of Health Significantly Affecting Care: [Homelessness, lacking transportation, uninsured, unable to afford medications]  Prescription Drug Consideration: [Antibiotics, antivirals, pain medications, etc.]  Discussion of Management with Other Providers:  I discussed the patient/results with: [admitting team, consultant, radiologist, social work, EPAT, case management, PT/OT, RT, PCP, etc.]      Jonnie Doss PA-C              Patient History   Past Medical History:   Diagnosis Date    COPD (chronic obstructive pulmonary disease) (Multi)     Hypertension      History reviewed. No pertinent surgical history.  No family history on file.  Social History     Tobacco Use    Smoking status: Former     Types: Cigarettes    Smokeless tobacco: Never   Substance Use Topics    Alcohol use: Yes     Comment: occasionally    Drug use: Never       Physical Exam   ED Triage  Vitals [11/04/24 1407]   Temperature Heart Rate Respirations BP   36.4 °C (97.6 °F) (!) 105 (!) 24 (!) 149/92      Pulse Ox Temp Source Heart Rate Source Patient Position   94 % Temporal Monitor --      BP Location FiO2 (%)     -- --       Physical Exam      ED Course & MDM   Diagnoses as of 11/05/24 1109   Acute on chronic respiratory failure with hypoxia (Multi)                 No data recorded     Valery Coma Scale Score: 15 (11/04/24 1410 : Marck Venegas RN)                           Medical Decision Making  79-year-old male with history of pulmonary fibrosis, A-fib, interstitial lung disease, hypertension hyperlipidemia, with acute on chronic respiratory failure likely secondary to CHF exacerbation, chronically on 2 L via nasal cannula, low suspicion for pulmonary emboli, no evidence suggesting acute coronary event, patient given 20 mg of Lasix in the ED and DuoNeb, albuterol nebulizers.        Procedure  Procedures     Jonnie Doss PA-C  11/04/24 1711       Jonnie Doss PA-C  11/05/24 1109

## 2024-11-04 NOTE — NURSING NOTE
Patient oriented to staff, call light and room. Patient denies pain. Vss. Patient eating dinner. Patient is on baseline o2 which is 2 lpm via nc.

## 2024-11-04 NOTE — ED TRIAGE NOTES
Pt arrives to [facility] using wheelchair, presenting from  for possible pneumonia. Pt states he has felt more short of breath, has a productive cough, sputum yellow in color. Pt denies any CP, N/V/D, fever and/or chills. Pt A&Ox4, resp appear more labored with exertion, skin of appropriate color.

## 2024-11-05 ENCOUNTER — APPOINTMENT (OUTPATIENT)
Dept: CARDIOLOGY | Facility: HOSPITAL | Age: 79
DRG: 189 | End: 2024-11-05
Payer: MEDICARE

## 2024-11-05 PROBLEM — J84.112 IPF (IDIOPATHIC PULMONARY FIBROSIS) (MULTI): Status: ACTIVE | Noted: 2024-01-16

## 2024-11-05 PROBLEM — J96.21 ACUTE ON CHRONIC RESPIRATORY FAILURE WITH HYPOXIA (MULTI): Status: ACTIVE | Noted: 2024-11-05

## 2024-11-05 LAB
ANION GAP SERPL CALC-SCNC: 7 MMOL/L (ref 10–20)
ATRIAL RATE: 120 BPM
BNP SERPL-MCNC: 121 PG/ML (ref 0–99)
BUN SERPL-MCNC: 16 MG/DL (ref 6–23)
CALCIUM SERPL-MCNC: 8.3 MG/DL (ref 8.6–10.3)
CHLORIDE SERPL-SCNC: 102 MMOL/L (ref 98–107)
CO2 SERPL-SCNC: 33 MMOL/L (ref 21–32)
CREAT SERPL-MCNC: 0.75 MG/DL (ref 0.5–1.3)
EGFRCR SERPLBLD CKD-EPI 2021: >90 ML/MIN/1.73M*2
ERYTHROCYTE [DISTWIDTH] IN BLOOD BY AUTOMATED COUNT: 13.3 % (ref 11.5–14.5)
GLUCOSE SERPL-MCNC: 83 MG/DL (ref 74–99)
HCT VFR BLD AUTO: 35.8 % (ref 41–52)
HGB BLD-MCNC: 11.6 G/DL (ref 13.5–17.5)
HOLD SPECIMEN: NORMAL
MCH RBC QN AUTO: 34.8 PG (ref 26–34)
MCHC RBC AUTO-ENTMCNC: 32.4 G/DL (ref 32–36)
MCV RBC AUTO: 108 FL (ref 80–100)
NRBC BLD-RTO: 0 /100 WBCS (ref 0–0)
PLATELET # BLD AUTO: 249 X10*3/UL (ref 150–450)
POTASSIUM SERPL-SCNC: 4 MMOL/L (ref 3.5–5.3)
Q ONSET: 197 MS
QRS COUNT: 18 BEATS
QRS DURATION: 126 MS
QT INTERVAL: 320 MS
QTC CALCULATION(BAZETT): 430 MS
QTC FREDERICIA: 390 MS
R AXIS: 42 DEGREES
RBC # BLD AUTO: 3.33 X10*6/UL (ref 4.5–5.9)
SODIUM SERPL-SCNC: 138 MMOL/L (ref 136–145)
T AXIS: -43 DEGREES
T OFFSET: 357 MS
VENTRICULAR RATE: 109 BPM
WBC # BLD AUTO: 10.2 X10*3/UL (ref 4.4–11.3)

## 2024-11-05 PROCEDURE — 2500000004 HC RX 250 GENERAL PHARMACY W/ HCPCS (ALT 636 FOR OP/ED): Mod: IPSPLIT

## 2024-11-05 PROCEDURE — 99222 1ST HOSP IP/OBS MODERATE 55: CPT | Performed by: NURSE PRACTITIONER

## 2024-11-05 PROCEDURE — 94640 AIRWAY INHALATION TREATMENT: CPT | Mod: IPSPLIT

## 2024-11-05 PROCEDURE — 2500000001 HC RX 250 WO HCPCS SELF ADMINISTERED DRUGS (ALT 637 FOR MEDICARE OP)

## 2024-11-05 PROCEDURE — 2500000005 HC RX 250 GENERAL PHARMACY W/O HCPCS: Mod: IPSPLIT

## 2024-11-05 PROCEDURE — 2500000005 HC RX 250 GENERAL PHARMACY W/O HCPCS

## 2024-11-05 PROCEDURE — 36415 COLL VENOUS BLD VENIPUNCTURE: CPT

## 2024-11-05 PROCEDURE — 85027 COMPLETE CBC AUTOMATED: CPT

## 2024-11-05 PROCEDURE — 2500000005 HC RX 250 GENERAL PHARMACY W/O HCPCS: Performed by: INTERNAL MEDICINE

## 2024-11-05 PROCEDURE — 96376 TX/PRO/DX INJ SAME DRUG ADON: CPT

## 2024-11-05 PROCEDURE — 1200000002 HC GENERAL ROOM WITH TELEMETRY DAILY: Mod: IPSPLIT

## 2024-11-05 PROCEDURE — 80048 BASIC METABOLIC PNL TOTAL CA: CPT

## 2024-11-05 PROCEDURE — 2500000002 HC RX 250 W HCPCS SELF ADMINISTERED DRUGS (ALT 637 FOR MEDICARE OP, ALT 636 FOR OP/ED): Mod: IPSPLIT | Performed by: HOSPITALIST

## 2024-11-05 PROCEDURE — 94664 DEMO&/EVAL PT USE INHALER: CPT

## 2024-11-05 PROCEDURE — 94760 N-INVAS EAR/PLS OXIMETRY 1: CPT | Mod: IPSPLIT

## 2024-11-05 PROCEDURE — 83880 ASSAY OF NATRIURETIC PEPTIDE: CPT

## 2024-11-05 RX ORDER — FUROSEMIDE 10 MG/ML
40 INJECTION INTRAMUSCULAR; INTRAVENOUS EVERY 12 HOURS
Status: COMPLETED | OUTPATIENT
Start: 2024-11-05 | End: 2024-11-05

## 2024-11-05 RX ORDER — ACETAMINOPHEN 325 MG/1
650 TABLET ORAL EVERY 4 HOURS PRN
Status: DISCONTINUED | OUTPATIENT
Start: 2024-11-05 | End: 2024-11-06 | Stop reason: HOSPADM

## 2024-11-05 RX ORDER — ACETAMINOPHEN 650 MG/1
650 SUPPOSITORY RECTAL EVERY 4 HOURS PRN
Status: DISCONTINUED | OUTPATIENT
Start: 2024-11-05 | End: 2024-11-05

## 2024-11-05 RX ORDER — ALBUTEROL SULFATE 0.83 MG/ML
2.5 SOLUTION RESPIRATORY (INHALATION) EVERY 2 HOUR PRN
Status: DISCONTINUED | OUTPATIENT
Start: 2024-11-05 | End: 2024-11-06 | Stop reason: HOSPADM

## 2024-11-05 RX ORDER — ACETAMINOPHEN 160 MG/5ML
650 SOLUTION ORAL EVERY 4 HOURS PRN
Status: DISCONTINUED | OUTPATIENT
Start: 2024-11-05 | End: 2024-11-05

## 2024-11-05 ASSESSMENT — ENCOUNTER SYMPTOMS
CARDIOVASCULAR NEGATIVE: 1
GASTROINTESTINAL NEGATIVE: 1
ALLERGIC/IMMUNOLOGIC NEGATIVE: 1
CONSTITUTIONAL NEGATIVE: 1
ENDOCRINE NEGATIVE: 1
MUSCULOSKELETAL NEGATIVE: 1
EYES NEGATIVE: 1
NEUROLOGICAL NEGATIVE: 1
SHORTNESS OF BREATH: 1
PSYCHIATRIC NEGATIVE: 1
HEMATOLOGIC/LYMPHATIC NEGATIVE: 1

## 2024-11-05 ASSESSMENT — PAIN SCALES - GENERAL
PAINLEVEL_OUTOF10: 0 - NO PAIN

## 2024-11-05 ASSESSMENT — PAIN - FUNCTIONAL ASSESSMENT
PAIN_FUNCTIONAL_ASSESSMENT: 0-10

## 2024-11-05 ASSESSMENT — ACTIVITIES OF DAILY LIVING (ADL): LACK_OF_TRANSPORTATION: NO

## 2024-11-05 NOTE — CARE PLAN
Problem: Pain - Adult  Goal: Verbalizes/displays adequate comfort level or baseline comfort level  Outcome: Progressing     Problem: Safety - Adult  Goal: Free from fall injury  Outcome: Progressing     Problem: Discharge Planning  Goal: Discharge to home or other facility with appropriate resources  Outcome: Progressing     Problem: Chronic Conditions and Co-morbidities  Goal: Patient's chronic conditions and co-morbidity symptoms are monitored and maintained or improved  Outcome: Progressing     Problem: Fall/Injury  Goal: Not fall by end of shift  Outcome: Progressing  Goal: Be free from injury by end of the shift  Outcome: Progressing  Goal: Verbalize understanding of personal risk factors for fall in the hospital  Outcome: Progressing  Goal: Verbalize understanding of risk factor reduction measures to prevent injury from fall in the home  Outcome: Progressing  Goal: Use assistive devices by end of the shift  Outcome: Progressing  Goal: Pace activities to prevent fatigue by end of the shift  Outcome: Progressing     Problem: Pain - Adult  Goal: Verbalizes/displays adequate comfort level or baseline comfort level  Outcome: Progressing     Problem: Safety - Adult  Goal: Free from fall injury  Outcome: Progressing     Problem: Discharge Planning  Goal: Discharge to home or other facility with appropriate resources  Outcome: Progressing     Problem: Chronic Conditions and Co-morbidities  Goal: Patient's chronic conditions and co-morbidity symptoms are monitored and maintained or improved  Outcome: Progressing     Problem: Fall/Injury  Goal: Not fall by end of shift  Outcome: Progressing  Goal: Be free from injury by end of the shift  Outcome: Progressing  Goal: Verbalize understanding of personal risk factors for fall in the hospital  Outcome: Progressing  Goal: Verbalize understanding of risk factor reduction measures to prevent injury from fall in the home  Outcome: Progressing  Goal: Use assistive devices by  end of the shift  Outcome: Progressing  Goal: Pace activities to prevent fatigue by end of the shift  Outcome: Progressing   The patient's goals for the shift include  Breathe better    The clinical goals for the shift include Pt will report less JOHNSON    Pt has remained on 2l 02 which is chronic for him. He has tolerated his breathing treatments well.

## 2024-11-05 NOTE — CONSULTS
Cardiology Consult Note  Patient: Aquilino Koenig Jr.  Unit/Bed: 207/207-A  YOB: 1945    Admitting Diagnosis: Paroxysmal atrial fibrillation (Multi) [I48.0]  Acute on chronic respiratory failure with hypoxia (Multi) [J96.21]  Date:  11/4/2024  Hospital Day: 0  Attending: Cardiology consulting at the request of  Andi Sloan MD  for concern for CHF exacerbation, PAF     Chief Complaint   Patient presents with    Cough    Shortness of Breath      History of Present Illness:   Aquilino Koenig Jr. is a 79 y.o. Male who presented via [facility] with c/o increased SOB and complaints of viral illness symptoms. Per patient he thought he just had a cold for the last couple of weeks. He was on steroids for breathing and did not seem to be helping. Does not weight self daily however weight stable around 183, denies BLE edema, Orthopnea/PND. Appetite has been good and no early satiety. He is Sob at baseline and does not feel it is any worse than usual.        Past medical history significant for  Interstitial lung disease, pulmonary hypertension, HLD, HTN, Persistent afib on apixaban.    Primary Cardiology outpatient: Dr. Ray, Dr. Alex     SUBJECTIVE:   Sitting up in the chair, no acute distress. Denies CP, palpitations. Baseline SOB on oxygen via NC>        ALLERGIES:   No Known Allergies   Home Medication:  Medications Prior to Admission   Medication Sig Dispense Refill Last Dose/Taking    albuterol 90 mcg/actuation inhaler Inhale every 4 hours if needed. (Patient not taking: Reported on 11/4/2024)   More than a month    apixaban (Eliquis) 5 mg tablet Take 1 tablet (5 mg) by mouth 2 times a day.       atorvastatin (Lipitor) 40 mg tablet Take 1 tablet (40 mg) by mouth once daily.       doxazosin (Cardura) 8 mg tablet Take 1 tablet (8 mg) by mouth once daily.       finasteride (Proscar) 5 mg tablet Take 1 tablet (5 mg) by mouth once daily.       metoprolol succinate XL (Toprol-XL) 50 mg 24 hr tablet Take 1 tablet  (50 mg) by mouth once daily.       nintedanib (Ofev) 150 mg capsule capsule Take 1 capsule (150 mg) by mouth every 12 hours.       torsemide (Demadex) 20 mg tablet Take 1 tablet (20 mg) by mouth once daily.         PMHx:  Past Medical History:   Diagnosis Date    COPD (chronic obstructive pulmonary disease) (Multi)     Hypertension        PSHx:  Past Surgical History:   Procedure Laterality Date    CHOLECYSTECTOMY         SOCIAL Hx:  Social History     Socioeconomic History    Marital status:    Tobacco Use    Smoking status: Former     Types: Cigarettes    Smokeless tobacco: Never   Substance and Sexual Activity    Alcohol use: Yes     Comment: occasionally    Drug use: Never     Social Drivers of Health     Financial Resource Strain: Low Risk  (11/4/2024)    Overall Financial Resource Strain (CARDIA)     Difficulty of Paying Living Expenses: Not hard at all   Food Insecurity: No Food Insecurity (11/4/2024)    Hunger Vital Sign     Worried About Running Out of Food in the Last Year: Never true     Ran Out of Food in the Last Year: Never true   Transportation Needs: No Transportation Needs (11/4/2024)    PRAPARE - Transportation     Lack of Transportation (Medical): No     Lack of Transportation (Non-Medical): No   Intimate Partner Violence: Not At Risk (11/4/2024)    Humiliation, Afraid, Rape, and Kick questionnaire     Fear of Current or Ex-Partner: No     Emotionally Abused: No     Physically Abused: No     Sexually Abused: No   Housing Stability: Low Risk  (11/4/2024)    Housing Stability Vital Sign     Unable to Pay for Housing in the Last Year: No     Number of Times Moved in the Last Year: 0     Homeless in the Last Year: No       FAMILY Hx:  Family History   Problem Relation Name Age of Onset    Diabetes Mother      Heart disease Father         REVIEW OF SYMPTOMS:   12 system review of symptoms is negative except as noted above          OBJECTIVE:     VITALS:   Visit Vitals  BP (!) 137/91 (BP  Location: Left arm, Patient Position: Lying)   Pulse 103   Temp 36.8 °C (98.2 °F) (Temporal)   Resp 19        Wt Readings from Last 5 Encounters:   11/04/24 82 kg (180 lb 12.4 oz)   09/07/21 92.5 kg (204 lb)       INTAKE/ OUTPUT:   I/O last 3 completed shifts:  In: 240 (2.9 mL/kg) [P.O.:240]  Out: 1575 (19.2 mL/kg) [Urine:1575 (0.5 mL/kg/hr)]  Weight: 82 kg      TELEMETRY MONITORING: PAF 100s    PHYSICAL EXAMINATION:    Vitals reviewed.   Constitutional:       General: Awake.      Appearance: Normal appearance. Well-developed and not in distress.   Eyes:      General: Lids are normal.      Pupils: Pupils are equal, round, and reactive to light.   HENT:      Right Ear: External ear normal.      Left Ear: External ear normal.      Nose: Nose normal.    Mouth/Throat:      Lips: Pink.      Mouth: Mucous membranes are moist.   Neck:      Vascular: JVD normal.   Pulmonary:      Effort: Pulmonary effort is normal.      Breath sounds: Normal air entry. Examination of the right-lower field reveals rales. Examination of the left-lower field reveals rales. Rales present.   Cardiovascular:      PMI at left midclavicular line. Normal rate. Regular rhythm. Normal S1. Normal S2.       Murmurs: There is no murmur.   Edema:     Peripheral edema absent.   Abdominal:      General: Bowel sounds are normal.      Palpations: Abdomen is soft.      Tenderness: There is no abdominal tenderness.   Musculoskeletal: Normal range of motion.      Cervical back: Full passive range of motion without pain, normal range of motion and neck supple. Skin:     General: Skin is warm and dry.   Neurological:      General: No focal deficit present.      Mental Status: Alert, oriented to person, place, and time and oriented to person, place and time. Mental status is at baseline.   Psychiatric:         Attention and Perception: Attention normal.         Mood and Affect: Mood normal.         Speech: Speech normal.            LABS:  CMP:   Recent Labs      "11/05/24  0557 11/04/24  1459 09/04/21  1612    140 138   K 4.0 4.0 4.1    102 101   CO2 33* 27 24   ANIONGAP 7* 15 13   BUN 16 17 22   CREATININE 0.75 0.63 0.8   EGFR >90 >90 100   MG  --  1.74  --      LIVER ENZYMES:   Recent Labs     11/04/24  1459   ALBUMIN 3.7   ALT 24   AST 26   BILITOT 0.7     CBC:  Recent Labs     11/05/24  0557 11/04/24  1459 09/04/21  1612   WBC 10.2 11.8* 9.8   HGB 11.6* 13.1* 14.0   HCT 35.8* 39.8* 42.6    263 260   * 106* 98.6     COAG:   Recent Labs     11/04/24  1459   INR 1.7*     ABO: No results for input(s): \"ABO\" in the last 45896 hours.  HEME/ENDO:No results for input(s): \"FERRITIN\", \"IRONSAT\", \"TSH\", \"HGBA1C\" in the last 14949 hours.   CARDIAC:   Recent Labs     11/05/24  0557 11/04/24  1622 11/04/24  1459   TROPHS  --  11 11   *  --  218*       Lipid Panel:  No results found for: \"HDL\", \"CHHDL\", \"VLDL\", \"TRIG\", \"NHDL\"       CURRENT MEDICATIONS:   Infusions:     Scheduled:  apixaban, 5 mg, BID  atorvastatin, 40 mg, Daily  doxazosin, 8 mg, Daily  finasteride, 5 mg, Daily  furosemide, 40 mg, q12h  ipratropium-albuteroL, 3 mL, TID  metoprolol succinate XL, 50 mg, Daily  pantoprazole, 40 mg, Daily before breakfast   Or  pantoprazole, 40 mg, Daily before breakfast  perflutren lipid microspheres, 0.5-10 mL of dilution, Once in imaging  perflutren protein A microsphere, 0.5 mL, Once in imaging  sulfur hexafluoride microsphr, 2 mL, Once in imaging  [Held by provider] torsemide, 20 mg, Daily      PRN:  acetaminophen, 650 mg, q4h PRN   Or  acetaminophen, 650 mg, q4h PRN   Or  acetaminophen, 650 mg, q4h PRN  albuterol, 2.5 mg, q2h PRN  melatonin, 9 mg, Nightly PRN  ondansetron, 4 mg, q8h PRN   Or  ondansetron, 4 mg, q8h PRN  polyethylene glycol, 17 g, Daily PRN          LAST IMAGING RESULTS INDEPENDENTLY REVIEWED:   XR chest 1 view  Narrative: STUDY:  Chest Radiograph;  11/4/2024 at 3:49 PM.  INDICATION:  Shortness of breath.  COMPARISON:  None " available.  ACCESSION NUMBER(S):  [id number]  ORDERING CLINICIAN:  CHUCK RAMESH  TECHNIQUE:  Frontal chest was obtained at 15:49 hours.  FINDINGS:  CARDIOMEDIASTINAL SILHOUETTE:  Cardiomediastinal silhouette is normal in size and configuration.     LUNGS:  There is a diffuse increase in interstitial markings throughout the  lungs bilaterally with some elevation of the right hemidiaphragm.  No  definite vascular congestion, effusion or pneumothorax is visible..     ABDOMEN:  No remarkable upper abdominal findings.     BONES:  No acute osseous changes.  Impression: There is a diffuse increase in interstitial markings in the lungs  bilaterally with elevation of the right hemidiaphragm.  No definite  alveolar consolidation or effusion is visible..  Signed by Tico Horne MD           CARDIOVASCULAR STUDIES:       EKG dated 11/4/2024 independently reviewed   Atrial fibrillation with RVR, relatively rate controlled 109 bpm      Echocardiogram 7/16/2024  CONCLUSIONS:   - Exam indication: PHTN   - The left ventricle is normal in size. There is mild left ventricular   hypertrophy. Left ventricular systolic function is normal. EF = 63 ± 5% (2D   biplane)   - The right ventricle is mildly dilated. Right ventricular systolic function is   mildly decreased.   - The left atrial cavity is severely dilated.   - There is mild (1+ - 2+) mitral valve regurgitation.   - There is mild (1+ - 2+) tricuspid valve regurgitation.   - There is mild (1+ - 2+) aortic valve regurgitation.   - Estimated right ventricular systolic pressure is 40 mmHg consistent with mild   pulmonary hypertension. Estimated right atrial pressure is 3 mmHg based on IVC   assessment.   - Prior RVSP: 57 mmHg.   - Exam was compared with the prior  echocardiographic exam performed on   1/16/2024. RVSP is slightly lower today. Aortic regurgitation has increased   slightly.      Echocardiogram 1/16/2024  - The left ventricle is normal in size. There is mild left  ventricular   hypertrophy. Left ventricular systolic function is normal. EF = 63 ± 5% (2D   biplane) Left ventricular diastolic function was not evaluated due to AF.   - The right ventricle is normal in size. Right ventricular systolic function is   normal.   - The left atrial cavity is severely dilated.   - The visualized aorta is borderline dilated with a maximal dimension of 3.8 cm.   - There is 1-2+ MR.   - There is moderate (2+) tricuspid valve regurgitation. TR appears most prominent   in clips #18, 20, & 28.   - Estimated right ventricular systolic pressure is 57 mmHg consistent with   moderate pulmonary hypertension. Estimated right atrial pressure is 3 mmHg based   on IVC assessment.   - The patient has not had a prior CC echocardiographic exam for comparison.       ASSESSMENT:   78 yo M admitted with acute on chronic hypoxic respiratory failure, multifactorial with concern for Acute decompensated diastolic heart failure    ILD  Pulmonary Hypertension  Atrial fibrillation on Apixaban  HTN  HLD  BPH    PLAN:    Reviewed recent echo cardiogram no need to repeat, preserved LVEF, moderate pulmonary HTN  Continue trial of furosemide today does not appear clinical hypervolemic on examination  Supportive treatment per primary team   Recommend follow up with pulmonary and Primary cardiac team to discuss RHC       Thank you  for the consult   Will follow   Please call or message with questions, concerns or changes in clinical condition   The case was discussed with EITAN Ho       Electronically signed by EITAN Mera on 11/5/2024 at 8:28 AM  I spent 60 minutes in the professional and overall care of this patient.

## 2024-11-05 NOTE — DISCHARGE INSTR - OTHER ORDERS
Thank you for choosing [facility] for your Health Care needs.  Also, thank you for allowing us to take you and your families preferences into account when determining your discharge plan.  Stay well!     You may receive a survey in the mail within the next couple weeks. Please take the time to complete it and return it. Your input is ALWAYS important to us. Thank you!  Your Care Transition Team - Anastasia Marlow & Angie - [phone]      For questions about your medications listed on your discharge instructions, please call the Nurses Station at [phone].

## 2024-11-05 NOTE — CARE PLAN
"The patient's goals for the shift include  \"able to get up with out my oxygen dropping.\"    The clinical goals for the shift include goal SPO2 >92% on 2L chronic oxygen.    Over the shift, the patient did make progress toward the following goals. Pt up to BSC throughout night, desats to mid 80's, recovers after sitting and rest. Tolerated breathing treatments. Call light with in reach.     "

## 2024-11-05 NOTE — PROGRESS NOTES
11/05/24 0941   Discharge Planning   Living Arrangements Spouse/significant other   Support Systems Spouse/significant other;Children;Family members   Assistance Needed Patient alert and oriented; [place]  His family is over daily.  Patient says he utilizes the first floor.  He says he is independent with ADLs/IADLs, and ambulation.  He drives.  DME:  Tub bath with grab bars and shower chair; Oxygen 2L baseline.   Type of Residence Private residence   Number of Stairs to Enter Residence 2   Number of Stairs Within Residence 10   Do you have animals or pets at home? Yes   Type of Animals or Pets dog   Home or Post Acute Services None   Expected Discharge Disposition Home   Does the patient need discharge transport arranged? No  (son)     OBSERVATION:  Confirmed address, pharmacy,  and PCP (Lopez Pierce).    2:25 pm  Patient upgraded to inpatient status.  IMM letter given and explained to patient. Consent/signature obtained and copy given to patient.     DC PLAN:  Home

## 2024-11-05 NOTE — H&P
History Of Present Illness  Aquilino Koenig Jr. is a 79 y.o. male presenting with increasing SOB. Significant medical history of a-fib, HTN, ILD, chronic hypoxic respiratory failure, pulmonary hypertension. Denies any known exposure to sick contacts. Patient denies leg edema, calf pain, recent travel, immobilization, recent surgery or trauma, hemoptysis, hormonal therapy, personal history of DVT or PE. He denies fever/chills/sweats, fatigue, body aches, ear pain, sore throat, mouth sores, sinus pain or pressure, change in voice, neck stiffness/pain, difficulty breathing or swallowing, hemoptysis, wheezing, CP, palpitations, abd pain, n/v/d, rashes, HA, dizziness/lightheadedness. He denies any other symptoms or complaints.     Patient currently sitting in chair with continued SOB although he reports it is improving.  Patient currently requiring increased oxygen needs from his baseline.  Will diurese today and repeat CXR in the am to monitor for improvement vs need for steroids/antibiotics.  Patient denies any fever, chills, body aches, sinus changes, or sore throat.  Discussed current treatment plan, patient states understanding, and agrees.       Past Medical History  Past Medical History:   Diagnosis Date    COPD (chronic obstructive pulmonary disease) (Multi)     Hypertension        Surgical History  Past Surgical History:   Procedure Laterality Date    CHOLECYSTECTOMY          Social History  He reports that he has quit smoking. His smoking use included cigarettes. He has never used smokeless tobacco. He reports current alcohol use. He reports that he does not use drugs.    Family History  Family History   Problem Relation Name Age of Onset    Diabetes Mother      Heart disease Father          Allergies  Patient has no known allergies.    Review of Systems   Constitutional: Negative.    HENT: Negative.     Eyes: Negative.    Respiratory:  Positive for shortness of breath.    Cardiovascular: Negative.   "  Gastrointestinal: Negative.    Endocrine: Negative.    Genitourinary: Negative.    Musculoskeletal: Negative.    Skin: Negative.    Allergic/Immunologic: Negative.    Neurological: Negative.    Hematological: Negative.    Psychiatric/Behavioral: Negative.          Physical Exam  Constitutional:       Appearance: Normal appearance.   HENT:      Head: Normocephalic and atraumatic.      Nose: Nose normal.      Mouth/Throat:      Mouth: Mucous membranes are moist.   Eyes:      Extraocular Movements: Extraocular movements intact.      Pupils: Pupils are equal, round, and reactive to light.   Cardiovascular:      Rate and Rhythm: Tachycardia present. Rhythm irregular.      Pulses: Normal pulses.      Heart sounds: Normal heart sounds.   Pulmonary:      Effort: Pulmonary effort is normal.      Comments: Diminished  Abdominal:      General: Bowel sounds are normal.      Palpations: Abdomen is soft.   Musculoskeletal:         General: Normal range of motion.      Cervical back: Normal range of motion.   Skin:     General: Skin is warm and dry.      Capillary Refill: Capillary refill takes less than 2 seconds.   Neurological:      Mental Status: He is alert and oriented to person, place, and time. Mental status is at baseline.   Psychiatric:         Mood and Affect: Mood normal.         Behavior: Behavior normal.          Last Recorded Vitals  Blood pressure (!) 137/91, pulse 103, temperature 36.8 °C (98.2 °F), temperature source Temporal, resp. rate 19, height 1.702 m (5' 7\"), weight 82 kg (180 lb 12.4 oz), SpO2 96%.    Relevant Results  Scheduled medications  apixaban, 5 mg, oral, BID  atorvastatin, 40 mg, oral, Daily  doxazosin, 8 mg, oral, Daily  finasteride, 5 mg, oral, Daily  ipratropium-albuteroL, 3 mL, nebulization, TID  metoprolol succinate XL, 50 mg, oral, Daily  pantoprazole, 40 mg, oral, Daily before breakfast   Or  pantoprazole, 40 mg, intravenous, Daily before breakfast  perflutren lipid microspheres, 0.5-10 " mL of dilution, intravenous, Once in imaging  perflutren protein A microsphere, 0.5 mL, intravenous, Once in imaging  sulfur hexafluoride microsphr, 2 mL, intravenous, Once in imaging  torsemide, 20 mg, oral, Daily      Continuous medications     PRN medications  PRN medications: acetaminophen **OR** acetaminophen **OR** acetaminophen, acetaminophen **OR** acetaminophen **OR** acetaminophen, albuterol, melatonin, ondansetron **OR** ondansetron, polyethylene glycol    XR chest 1 view    Result Date: 11/4/2024  STUDY: Chest Radiograph;  11/4/2024 at 3:49 PM. INDICATION: Shortness of breath. COMPARISON: None available. ACCESSION NUMBER(S): [id number] ORDERING CLINICIAN: CHUCK RAMESH TECHNIQUE:  Frontal chest was obtained at 15:49 hours. FINDINGS: CARDIOMEDIASTINAL SILHOUETTE: Cardiomediastinal silhouette is normal in size and configuration.  LUNGS: There is a diffuse increase in interstitial markings throughout the lungs bilaterally with some elevation of the right hemidiaphragm.  No definite vascular congestion, effusion or pneumothorax is visible..  ABDOMEN: No remarkable upper abdominal findings.  BONES: No acute osseous changes.    There is a diffuse increase in interstitial markings in the lungs bilaterally with elevation of the right hemidiaphragm.  No definite alveolar consolidation or effusion is visible.. Signed by Tico Horne MD    CT angio chest for pulmonary embolism    Result Date: 10/18/2024  Interpreted By:  Jayashree Mace, STUDY: CT ANGIO CHEST FOR PULMONARY EMBOLISM;  10/18/2024 1:14 pm   INDICATION: Signs/Symptoms:STAT CT CHEST, PAPER ORDER SCANNED CALL PROVIDER ASAP WITH RESULTS [phone].   COMPARISON: Chest x-ray dated 08/16/2022   ACCESSION NUMBER(S): [id number]   ORDERING CLINICIAN: RADHA VILLARREAL   TECHNIQUE: Serial axial CT images of the chest obtained following the intravenous administration of 75 mL Omnipaque 350. Sagittal, coronal and MIP reconstructions were generated.    FINDINGS: VESSELS: There is good opacification of the pulmonary arteries. There are no obvious pulmonary artery emboli.   There are atherosclerotic changes of the aorta. There are calcifications near the aortic valve. The cava is unremarkable.   HEART:  The heart is normal in size. There are coronary artery calcifications.   MEDIASTINUM AND MELIDA: There is no significant mediastinal or hilar lymphadenopathy.   LUNG, PLEURA, AND LARGE AIRWAYS: There is interstitial lung disease. There is ground-glass appearance bilaterally. There is no obvious pleural fluid.   CHEST WALL AND LOWER NECK: The thyroid as visualized is unremarkable.   BONES: There are degenerative changes of the spine.   UPPER ABDOMEN: There is a 7 centimeters cystic area in left upper quadrant. It may be related to the pancreatic tail.   COMPARISON TO THE PRIOR EXAM: There was interstitial disease on the chest x-ray.       No evidence of PE.   Extensive interstitial lung disease presumably chronic.   Large cyst in the left upper quadrant.   Signed by: Jayashree Mace 10/18/2024 2:35 PM Dictation workstation:   [id number]     Results for orders placed or performed during the hospital encounter of 11/04/24 (from the past 24 hours)   CBC and Auto Differential   Result Value Ref Range    WBC 11.8 (H) 4.4 - 11.3 x10*3/uL    nRBC 0.0 0.0 - 0.0 /100 WBCs    RBC 3.74 (L) 4.50 - 5.90 x10*6/uL    Hemoglobin 13.1 (L) 13.5 - 17.5 g/dL    Hematocrit 39.8 (L) 41.0 - 52.0 %     (H) 80 - 100 fL    MCH 35.0 (H) 26.0 - 34.0 pg    MCHC 32.9 32.0 - 36.0 g/dL    RDW 13.2 11.5 - 14.5 %    Platelets 263 150 - 450 x10*3/uL    Neutrophils % 92.0 40.0 - 80.0 %    Immature Granulocytes %, Automated 0.6 0.0 - 0.9 %    Lymphocytes % 3.2 13.0 - 44.0 %    Monocytes % 3.8 2.0 - 10.0 %    Eosinophils % 0.3 0.0 - 6.0 %    Basophils % 0.1 0.0 - 2.0 %    Neutrophils Absolute 10.82 (H) 1.60 - 5.50 x10*3/uL    Immature Granulocytes Absolute, Automated 0.07 0.00 - 0.50 x10*3/uL     Lymphocytes Absolute 0.38 (L) 0.80 - 3.00 x10*3/uL    Monocytes Absolute 0.45 0.05 - 0.80 x10*3/uL    Eosinophils Absolute 0.04 0.00 - 0.40 x10*3/uL    Basophils Absolute 0.01 0.00 - 0.10 x10*3/uL   Magnesium   Result Value Ref Range    Magnesium 1.74 1.60 - 2.40 mg/dL   Comprehensive metabolic panel   Result Value Ref Range    Glucose 161 (H) 74 - 99 mg/dL    Sodium 140 136 - 145 mmol/L    Potassium 4.0 3.5 - 5.3 mmol/L    Chloride 102 98 - 107 mmol/L    Bicarbonate 27 21 - 32 mmol/L    Anion Gap 15 10 - 20 mmol/L    Urea Nitrogen 17 6 - 23 mg/dL    Creatinine 0.63 0.50 - 1.30 mg/dL    eGFR >90 >60 mL/min/1.73m*2    Calcium 8.8 8.6 - 10.3 mg/dL    Albumin 3.7 3.4 - 5.0 g/dL    Alkaline Phosphatase 74 33 - 136 U/L    Total Protein 6.8 6.4 - 8.2 g/dL    AST 26 9 - 39 U/L    Bilirubin, Total 0.7 0.0 - 1.2 mg/dL    ALT 24 10 - 52 U/L   Lactate   Result Value Ref Range    Lactate 2.0 0.4 - 2.0 mmol/L   Blood Gas Venous Full Panel   Result Value Ref Range    POCT pH, Venous 7.53 (H) 7.33 - 7.43 pH    POCT pCO2, Venous 37 (L) 41 - 51 mm Hg    POCT pO2, Venous 70 (H) 35 - 45 mm Hg    POCT SO2, Venous 96 (H) 45 - 75 %    POCT Oxy Hemoglobin, Venous 93.8 (H) 45.0 - 75.0 %    POCT Hematocrit Calculated, Venous 28.0 (L) 41.0 - 52.0 %    POCT Sodium, Venous 139 136 - 145 mmol/L    POCT Potassium, Venous 4.3 3.5 - 5.3 mmol/L    POCT Chloride, Venous 105 98 - 107 mmol/L    POCT Ionized Calicum, Venous 1.21 1.10 - 1.33 mmol/L    POCT Glucose, Venous 153 (H) 74 - 99 mg/dL    POCT Lactate, Venous 2.3 (H) 0.4 - 2.0 mmol/L    POCT Base Excess, Venous 7.7 (H) -2.0 - 3.0 mmol/L    POCT HCO3 Calculated, Venous 30.9 (H) 22.0 - 26.0 mmol/L    POCT Hemoglobin, Venous 9.3 (L) 13.5 - 17.5 g/dL    POCT Anion Gap, Venous 7.0 (L) 10.0 - 25.0 mmol/L    Patient Temperature      FiO2 28 %   B-Type Natriuretic Peptide   Result Value Ref Range     (H) 0 - 99 pg/mL   Protime-INR   Result Value Ref Range    Protime 19.8 (H) 9.8 - 12.8 seconds     INR 1.7 (H) 0.9 - 1.1   Troponin I, High Sensitivity, Initial   Result Value Ref Range    Troponin I, High Sensitivity 11 0 - 20 ng/L   Sars-CoV-2 PCR   Result Value Ref Range    Coronavirus 2019, PCR Not Detected Not Detected   Influenza A, and B PCR   Result Value Ref Range    Flu A Result Not Detected Not Detected    Flu B Result Not Detected Not Detected   Urinalysis with Reflex Culture and Microscopic   Result Value Ref Range    Color, Urine Yellow Light-Yellow, Yellow, Dark-Yellow    Appearance, Urine Clear Clear    Specific Gravity, Urine 1.022 1.005 - 1.035    pH, Urine 6.0 5.0, 5.5, 6.0, 6.5, 7.0, 7.5, 8.0    Protein, Urine 100 (2+) (A) NEGATIVE, 10 (TRACE), 20 (TRACE) mg/dL    Glucose, Urine Normal Normal mg/dL    Blood, Urine 0.1 (1+) (A) NEGATIVE    Ketones, Urine NEGATIVE NEGATIVE mg/dL    Bilirubin, Urine NEGATIVE NEGATIVE    Urobilinogen, Urine 4 (2+) (A) Normal mg/dL    Nitrite, Urine NEGATIVE NEGATIVE    Leukocyte Esterase, Urine NEGATIVE NEGATIVE   Extra Urine Gray Tube   Result Value Ref Range    Extra Tube Hold for add-ons.    Troponin, High Sensitivity, 1 Hour   Result Value Ref Range    Troponin I, High Sensitivity 11 0 - 20 ng/L   Urinalysis Microscopic   Result Value Ref Range    WBC, Urine 1-5 1-5, NONE /HPF    RBC, Urine 6-10 (A) NONE, 1-2, 3-5 /HPF    Mucus, Urine FEW Reference range not established. /LPF        Assessment/Plan   Assessment & Plan  Dyspnea    Atrial fibrillation (Multi)    Benign essential hypertension    Benign prostatic hyperplasia    Hyperlipidemia    IPF (idiopathic pulmonary fibrosis) (Multi)      Principal Problems  #Dyspnea  #IPF (idiopathic pulmonary fibrosis) (Multi)  -WBC 11.8 > 10.2  -Lactate 2.0  -Covid/Flu Negative  -Procalcitonin Ordered  -Sputum Culture if Able  -CXR  IMPRESSION:  There is a diffuse increase in interstitial markings in the lungs  bilaterally with elevation of the right hemidiaphragm.  No definite  alveolar consolidation or effusion is  visible.  -CTA Chest  IMPRESSION:  No evidence of PE.  Extensive interstitial lung disease presumably chronic.  Large cyst in the left upper quadrant.  -DuoNebs  -Patient needs home script for Aerosol machine, also needs to say home nebulizer disposable kit.    -Home O2 2L Chronic  -Current O2 4L NC  -Repeat CXR 11/6/24 per Dr. Sloan for improvement from lasix vs need for steroids/antibiotics.      Active Problems  #Atrial fibrillation (Multi)  #Benign essential hypertension  #Hyperlipidemia  -Magnesium 1.74  - > 121  -Troponin 11 > 11  -continue Eliquis, Lipitor, Cadura, Metoprolol, Demadex   -ECHO 7/16/24.   - The left ventricle is normal in size. There is mild left ventricular   hypertrophy. Left ventricular systolic function is normal. EF = 63 ± 5% (2D   biplane)   - The right ventricle is mildly dilated. Right ventricular systolic function is   mildly decreased.   - The left atrial cavity is severely dilated.   - There is mild (1+ - 2+) mitral valve regurgitation.   - There is mild (1+ - 2+) tricuspid valve regurgitation.   - There is mild (1+ - 2+) aortic valve regurgitation.   - Estimated right ventricular systolic pressure is 40 mmHg consistent with mild   pulmonary hypertension. Estimated right atrial pressure is 3 mmHg based on IVC   assessment.   - Prior RVSP: 57 mmHg.   - Exam was compared with the prior  echocardiographic exam performed on   1/16/2024. RVSP is slightly lower today. Aortic regurgitation has increased   slightly.   -Lasix 20 mg IV given in ED  -Hold Demadex 20 mg while getting Lasix  -Lasix 40 mg IV BID x 2 doses on 11/5  -Cardiology Consult, appreciate recs    #Benign prostatic hyperplasia  -continue Proscar      DVT Prophylaxis: Eliquis  Fluids: N/A   Nutrition: Cardiac    Code Status: Full Code    Pt requires inpatient stay at this time.  Total accumulated time spent face to face and not face to face preparing to see the patient, obtaining and reviewing separately obtained  history; performing a medically appropriate examination and/or evaluation; counseling and educating the patient, family; ordering medications, tests, or procedures; referring and communicating with other health care professionals; documenting clinical information in the patient's medical record; independently interpreting results and communicating the results to the patient, family; and care coordination was 45 minutes.    Patient seen and evaluated with attending Dr. Luther Fleming, BROOKLYNN-CNP

## 2024-11-06 ENCOUNTER — APPOINTMENT (OUTPATIENT)
Dept: RADIOLOGY | Facility: HOSPITAL | Age: 79
DRG: 189 | End: 2024-11-06
Payer: MEDICARE

## 2024-11-06 VITALS
HEART RATE: 97 BPM | DIASTOLIC BLOOD PRESSURE: 80 MMHG | HEIGHT: 67 IN | BODY MASS INDEX: 28.37 KG/M2 | SYSTOLIC BLOOD PRESSURE: 111 MMHG | RESPIRATION RATE: 18 BRPM | WEIGHT: 180.78 LBS | TEMPERATURE: 97.7 F | OXYGEN SATURATION: 96 %

## 2024-11-06 LAB
ANION GAP SERPL CALC-SCNC: 11 MMOL/L (ref 10–20)
BUN SERPL-MCNC: 15 MG/DL (ref 6–23)
CALCIUM SERPL-MCNC: 8.4 MG/DL (ref 8.6–10.3)
CHLORIDE SERPL-SCNC: 100 MMOL/L (ref 98–107)
CO2 SERPL-SCNC: 32 MMOL/L (ref 21–32)
CREAT SERPL-MCNC: 0.73 MG/DL (ref 0.5–1.3)
EGFRCR SERPLBLD CKD-EPI 2021: >90 ML/MIN/1.73M*2
ERYTHROCYTE [DISTWIDTH] IN BLOOD BY AUTOMATED COUNT: 13.3 % (ref 11.5–14.5)
GLUCOSE SERPL-MCNC: 104 MG/DL (ref 74–99)
HCT VFR BLD AUTO: 39.7 % (ref 41–52)
HGB BLD-MCNC: 12.8 G/DL (ref 13.5–17.5)
HOLD SPECIMEN: NORMAL
HOLD SPECIMEN: NORMAL
MCH RBC QN AUTO: 35 PG (ref 26–34)
MCHC RBC AUTO-ENTMCNC: 32.2 G/DL (ref 32–36)
MCV RBC AUTO: 109 FL (ref 80–100)
NRBC BLD-RTO: 0 /100 WBCS (ref 0–0)
PLATELET # BLD AUTO: 267 X10*3/UL (ref 150–450)
POTASSIUM SERPL-SCNC: 4 MMOL/L (ref 3.5–5.3)
PROCALCITONIN SERPL-MCNC: 0.03 NG/ML
RBC # BLD AUTO: 3.66 X10*6/UL (ref 4.5–5.9)
SODIUM SERPL-SCNC: 139 MMOL/L (ref 136–145)
WBC # BLD AUTO: 10.1 X10*3/UL (ref 4.4–11.3)

## 2024-11-06 PROCEDURE — 94760 N-INVAS EAR/PLS OXIMETRY 1: CPT | Mod: IPSPLIT

## 2024-11-06 PROCEDURE — 71045 X-RAY EXAM CHEST 1 VIEW: CPT | Mod: IPSPLIT

## 2024-11-06 PROCEDURE — 85027 COMPLETE CBC AUTOMATED: CPT | Mod: IPSPLIT

## 2024-11-06 PROCEDURE — 80048 BASIC METABOLIC PNL TOTAL CA: CPT | Mod: IPSPLIT

## 2024-11-06 PROCEDURE — 94640 AIRWAY INHALATION TREATMENT: CPT | Mod: IPSPLIT

## 2024-11-06 PROCEDURE — 2500000001 HC RX 250 WO HCPCS SELF ADMINISTERED DRUGS (ALT 637 FOR MEDICARE OP): Mod: IPSPLIT

## 2024-11-06 PROCEDURE — 2500000005 HC RX 250 GENERAL PHARMACY W/O HCPCS: Mod: IPSPLIT

## 2024-11-06 PROCEDURE — 36415 COLL VENOUS BLD VENIPUNCTURE: CPT | Mod: IPSPLIT

## 2024-11-06 PROCEDURE — 71045 X-RAY EXAM CHEST 1 VIEW: CPT | Performed by: RADIOLOGY

## 2024-11-06 PROCEDURE — 2500000002 HC RX 250 W HCPCS SELF ADMINISTERED DRUGS (ALT 637 FOR MEDICARE OP, ALT 636 FOR OP/ED): Mod: IPSPLIT | Performed by: HOSPITALIST

## 2024-11-06 PROCEDURE — 84145 PROCALCITONIN (PCT): CPT | Mod: GENLAB

## 2024-11-06 RX ORDER — IPRATROPIUM BROMIDE AND ALBUTEROL SULFATE 2.5; .5 MG/3ML; MG/3ML
3 SOLUTION RESPIRATORY (INHALATION) 3 TIMES DAILY
Qty: 270 ML | Refills: 1 | Status: SHIPPED | OUTPATIENT
Start: 2024-11-06 | End: 2025-01-05

## 2024-11-06 RX ORDER — FUROSEMIDE 40 MG/1
40 TABLET ORAL DAILY
Qty: 30 TABLET | Refills: 1 | Status: SHIPPED | OUTPATIENT
Start: 2024-11-06 | End: 2025-01-05

## 2024-11-06 ASSESSMENT — COGNITIVE AND FUNCTIONAL STATUS - GENERAL
MOBILITY SCORE: 24
DAILY ACTIVITIY SCORE: 24

## 2024-11-06 ASSESSMENT — PAIN SCALES - GENERAL
PAINLEVEL_OUTOF10: 0 - NO PAIN
PAINLEVEL_OUTOF10: 0 - NO PAIN

## 2024-11-06 ASSESSMENT — PAIN - FUNCTIONAL ASSESSMENT: PAIN_FUNCTIONAL_ASSESSMENT: 0-10

## 2024-11-06 NOTE — PROGRESS NOTES
11/06/24 0934   Discharge Planning   Assistance Needed Patient alert and oriented; [place] family is over daily. Patient says he utilizes the first floor. He says he is independent with ADLs/IADLs, and ambulation. He drives. DME: Tub bath with grab bars and shower chair; Oxygen 2L baseline.   Home or Post Acute Services None   Expected Discharge Disposition Home   Does the patient need discharge transport arranged? No     Patient medically ready for discharge.  Patient follow up information on discharge instructions.  Patient will be returning home. Patient is in agreement with discharge plan.      DC PLAN:  Home   Need Continuous Pulse Ox Monitoring

## 2024-11-06 NOTE — NURSING NOTE
Assumed patient care. Patient resting in chair No immediate needs at this time. Call light within reach.

## 2024-11-06 NOTE — CARE PLAN
The patient's goals for the shift include      The clinical goals for the shift include patient will remain above 92% while on supplemental oxygen    Over the shift, the patient did make progress toward the following goals.     Problem: Pain - Adult  Goal: Verbalizes/displays adequate comfort level or baseline comfort level  Outcome: Progressing     Problem: Safety - Adult  Goal: Free from fall injury  Outcome: Progressing     Problem: Fall/Injury  Goal: Not fall by end of shift  Outcome: Progressing     Problem: Fall/Injury  Goal: Pace activities to prevent fatigue by end of the shift  Outcome: Progressing

## 2024-11-06 NOTE — DISCHARGE SUMMARY
Discharge Diagnosis  Dyspnea    Issues Requiring Follow-Up  Follow up with PCP     Discharge Meds     Medication List      START taking these medications     furosemide 40 mg tablet; Commonly known as: Lasix; Take 1 tablet (40 mg)   by mouth once daily.   ipratropium-albuteroL 0.5-2.5 mg/3 mL nebulizer solution; Commonly known   as: Duo-Neb; Take 3 mL by nebulization 3 times a day.     CONTINUE taking these medications     atorvastatin 40 mg tablet; Commonly known as: Lipitor   doxazosin 8 mg tablet; Commonly known as: Cardura   Eliquis 5 mg tablet; Generic drug: apixaban   finasteride 5 mg tablet; Commonly known as: Proscar   metoprolol succinate XL 50 mg 24 hr tablet; Commonly known as: Toprol-XL   Ofev 150 mg capsule capsule; Generic drug: nintedanib     STOP taking these medications     albuterol 90 mcg/actuation inhaler   torsemide 20 mg tablet; Commonly known as: Demadex       Test Results Pending At Discharge  Pending Labs       Order Current Status    Procalcitonin In process            Hospital Course   Aquilino Koenig Jr. is a 79 y.o. male presenting with increasing SOB. Significant medical history of a-fib, HTN, ILD, chronic hypoxic respiratory failure, pulmonary hypertension. Denies any known exposure to sick contacts. Patient denies leg edema, calf pain, recent travel, immobilization, recent surgery or trauma, hemoptysis, hormonal therapy, personal history of DVT or PE. He denies fever/chills/sweats, fatigue, body aches, ear pain, sore throat, mouth sores, sinus pain or pressure, change in voice, neck stiffness/pain, difficulty breathing or swallowing, hemoptysis, wheezing, CP, palpitations, abd pain, n/v/d, rashes, HA, dizziness/lightheadedness. He denies any other symptoms or complaints.      Patient currently sitting in chair with continued SOB although he reports it is improving.  Patient currently requiring increased oxygen needs from his baseline.  Will diurese today and repeat CXR in the am to  monitor for improvement vs need for steroids/antibiotics.  Patient denies any fever, chills, body aches, sinus changes, or sore throat.  Discussed current treatment plan, patient states understanding, and agrees.      Hospital Course:  Principal Problems  #Dyspnea  #IPF (idiopathic pulmonary fibrosis) (Multi)  -WBC 11.8 > 10.2  -Lactate 2.0  -Covid/Flu Negative  -Procalcitonin Ordered  -Sputum Culture if Able  -CXR  IMPRESSION:  There is a diffuse increase in interstitial markings in the lungs  bilaterally with elevation of the right hemidiaphragm.  No definite  alveolar consolidation or effusion is visible.  -CTA Chest  IMPRESSION:  No evidence of PE.  Extensive interstitial lung disease presumably chronic.  Large cyst in the left upper quadrant.  -DuoNebs  -Patient needs home script for Aerosol machine, also needs to say home nebulizer disposable kit.    -Home O2 2L Chronic  -Current O2 4L NC  -Repeat CXR 11/6/24 per Dr. Sloan for improvement from lasix vs need for steroids/antibiotics.       Active Problems  #Atrial fibrillation (Multi)  #Benign essential hypertension  #Hyperlipidemia  -Magnesium 1.74  - > 121  -Troponin 11 > 11  -continue Eliquis, Lipitor, Cadura, Metoprolol, Demadex   -ECHO 7/16/24.   - The left ventricle is normal in size. There is mild left ventricular   hypertrophy. Left ventricular systolic function is normal. EF = 63 ± 5% (2D   biplane)   - The right ventricle is mildly dilated. Right ventricular systolic function is   mildly decreased.   - The left atrial cavity is severely dilated.   - There is mild (1+ - 2+) mitral valve regurgitation.   - There is mild (1+ - 2+) tricuspid valve regurgitation.   - There is mild (1+ - 2+) aortic valve regurgitation.   - Estimated right ventricular systolic pressure is 40 mmHg consistent with mild   pulmonary hypertension. Estimated right atrial pressure is 3 mmHg based on IVC   assessment.   - Prior RVSP: 57 mmHg.   - Exam was compared with the  prior CC echocardiographic exam performed on   1/16/2024. RVSP is slightly lower today. Aortic regurgitation has increased   slightly.   -Lasix 20 mg IV given in ED  -Hold Demadex 20 mg while getting Lasix  -Lasix 40 mg IV BID x 2 doses on 11/5  -Cardiology Consult, appreciate recs     #Benign prostatic hyperplasia  -continue Proscar        DVT Prophylaxis: Eliquis  Fluids: N/A   Nutrition: Cardiac     Code Status: Full Code    Dispo: Pt stable for discharge home on chronic o2. Switched torsemide to furosemide, will follow up with PCP. Duoneb solution sent to pharmacy.. RT faxed over neb order.     Total cumulative time spent in preparation of this discharge including documentation review, coordination of care with the medical team including PT/SW/care coordinators and treating consultants, discussion with patient and pertinent family members and finalization of prescriptions, followup appointments and this discharge summary was approximately  45 minutes. Over 50% of the time was spent face-to-face counseling the patient on diagnosis, prescriptions, and follow up appointments.     Pertinent Physical Exam At Time of Discharge  Physical Exam  Vitals reviewed.   HENT:      Head: Normocephalic and atraumatic.      Right Ear: External ear normal.      Left Ear: External ear normal.      Nose: Nose normal.      Mouth/Throat:      Pharynx: Oropharynx is clear.   Eyes:      Conjunctiva/sclera: Conjunctivae normal.   Cardiovascular:      Rate and Rhythm: Normal rate and regular rhythm.      Pulses: Normal pulses.      Heart sounds: Normal heart sounds.   Pulmonary:      Comments: Faint crackles RLL   Abdominal:      General: Bowel sounds are normal.      Palpations: Abdomen is soft.   Musculoskeletal:         General: Normal range of motion.      Cervical back: Normal range of motion and neck supple.   Skin:     General: Skin is dry.   Neurological:      General: No focal deficit present.      Mental Status: He is alert and  oriented to person, place, and time.   Psychiatric:         Mood and Affect: Mood normal.         Behavior: Behavior normal.         Outpatient Follow-Up  No future appointments.      BROOKLYNN Grewal-CNP

## 2024-11-14 LAB
ATRIAL RATE: 120 BPM
Q ONSET: 197 MS
QRS COUNT: 18 BEATS
QRS DURATION: 126 MS
QT INTERVAL: 320 MS
QTC CALCULATION(BAZETT): 430 MS
QTC FREDERICIA: 390 MS
R AXIS: 42 DEGREES
T AXIS: -43 DEGREES
T OFFSET: 357 MS
VENTRICULAR RATE: 109 BPM